# Patient Record
Sex: FEMALE | Race: WHITE | NOT HISPANIC OR LATINO | Employment: FULL TIME | URBAN - METROPOLITAN AREA
[De-identification: names, ages, dates, MRNs, and addresses within clinical notes are randomized per-mention and may not be internally consistent; named-entity substitution may affect disease eponyms.]

---

## 2023-09-05 PROBLEM — M17.11 PATELLOFEMORAL ARTHRITIS OF RIGHT KNEE: Status: ACTIVE | Noted: 2023-09-05

## 2023-09-05 PROBLEM — D18.00 ANGIOMA: Status: ACTIVE | Noted: 2022-03-24

## 2023-09-05 PROBLEM — N63.0 BREAST LUMP: Status: ACTIVE | Noted: 2023-09-05

## 2023-09-05 PROBLEM — D50.9 IRON DEFICIENCY ANEMIA: Status: ACTIVE | Noted: 2023-09-05

## 2023-09-05 PROBLEM — K21.9 GERD (GASTROESOPHAGEAL REFLUX DISEASE): Status: ACTIVE | Noted: 2023-09-05

## 2023-09-05 PROBLEM — N13.30 HYDRONEPHROSIS: Status: ACTIVE | Noted: 2023-09-05

## 2023-09-05 PROBLEM — R41.3 MEMORY DIFFICULTIES: Status: ACTIVE | Noted: 2023-09-05

## 2023-09-05 PROBLEM — M72.2 PLANTAR FASCIITIS OF RIGHT FOOT: Status: ACTIVE | Noted: 2023-09-05

## 2023-09-05 PROBLEM — H26.9 CATARACT: Status: ACTIVE | Noted: 2023-09-05

## 2023-09-05 PROBLEM — G21.9: Status: RESOLVED | Noted: 2023-09-05 | Resolved: 2023-09-05

## 2023-09-05 PROBLEM — K59.4 RECTAL SPASM: Status: ACTIVE | Noted: 2023-09-05

## 2023-09-05 PROBLEM — L81.4 LENTIGINES: Status: ACTIVE | Noted: 2022-03-24

## 2023-09-05 PROBLEM — F43.89 STRESS REACTION, CHRONIC: Status: ACTIVE | Noted: 2023-09-05

## 2023-09-05 PROBLEM — G20.C PARKINSONISM (MULTI): Status: ACTIVE | Noted: 2023-09-05

## 2023-09-05 PROBLEM — R73.03 PREDIABETES: Status: ACTIVE | Noted: 2023-09-05

## 2023-09-05 PROBLEM — E53.8 VITAMIN B12 DEFICIENCY: Status: ACTIVE | Noted: 2023-09-05

## 2023-09-05 PROBLEM — Z97.3 WEARS GLASSES: Status: ACTIVE | Noted: 2023-09-05

## 2023-09-05 PROBLEM — H52.7 REFRACTIVE ERROR: Status: ACTIVE | Noted: 2023-09-05

## 2023-09-05 PROBLEM — H25.13 NUCLEAR SCLEROSIS OF BOTH EYES: Status: ACTIVE | Noted: 2023-09-05

## 2023-09-05 PROBLEM — G21.9: Status: ACTIVE | Noted: 2023-09-05

## 2023-09-05 PROBLEM — H43.813 PVD (POSTERIOR VITREOUS DETACHMENT), BOTH EYES: Status: ACTIVE | Noted: 2023-09-05

## 2023-09-05 PROBLEM — N23 RENAL COLIC: Status: ACTIVE | Noted: 2023-09-05

## 2023-09-05 PROBLEM — R41.3 MEMORY LOSS: Status: ACTIVE | Noted: 2023-09-05

## 2023-09-05 PROBLEM — E86.0 DEHYDRATION: Status: ACTIVE | Noted: 2023-09-05

## 2023-09-05 PROBLEM — L57.0 ACTINIC KERATOSIS: Status: ACTIVE | Noted: 2022-03-24

## 2023-09-05 PROBLEM — L57.8 SUN-DAMAGED SKIN: Status: ACTIVE | Noted: 2022-03-24

## 2023-09-05 PROBLEM — M54.2 CERVICALGIA: Status: ACTIVE | Noted: 2023-09-05

## 2023-09-05 PROBLEM — R04.0 EPISTAXIS: Status: ACTIVE | Noted: 2023-09-05

## 2023-09-05 PROBLEM — E55.9 VITAMIN D DEFICIENCY: Status: ACTIVE | Noted: 2023-09-05

## 2023-09-05 PROBLEM — R55 SYNCOPE AND COLLAPSE: Status: ACTIVE | Noted: 2023-09-05

## 2023-09-05 PROBLEM — L82.1 OTHER SEBORRHEIC KERATOSIS: Status: ACTIVE | Noted: 2022-03-24

## 2023-09-05 PROBLEM — H18.452 SALZMANN'S NODULAR DEGENERATION OF CORNEA OF LEFT EYE: Status: ACTIVE | Noted: 2023-09-05

## 2023-09-05 PROBLEM — I10 ELEVATED BLOOD PRESSURE READING WITH DIAGNOSIS OF HYPERTENSION: Status: ACTIVE | Noted: 2023-09-05

## 2023-09-05 PROBLEM — M75.82 TENDINITIS OF LEFT ROTATOR CUFF: Status: ACTIVE | Noted: 2023-09-05

## 2023-09-05 PROBLEM — G25.0 ESSENTIAL TREMOR: Status: ACTIVE | Noted: 2023-09-05

## 2023-09-05 PROBLEM — S40.022A HEMATOMA OF ARM, LEFT, INITIAL ENCOUNTER: Status: ACTIVE | Noted: 2023-09-05

## 2023-09-05 PROBLEM — I10 BENIGN ESSENTIAL HTN: Status: ACTIVE | Noted: 2023-09-05

## 2023-09-05 PROBLEM — Z98.890 HISTORY OF REPAIR OF RETINAL TEAR BY LASER PHOTOCOAGULATION: Status: ACTIVE | Noted: 2023-09-05

## 2023-09-05 PROBLEM — M77.8 TENDINITIS OF LEFT SHOULDER: Status: ACTIVE | Noted: 2023-09-05

## 2023-09-05 PROBLEM — R06.09 DYSPNEA ON EFFORT: Status: ACTIVE | Noted: 2023-09-05

## 2023-09-05 PROBLEM — N39.41 URGE INCONTINENCE: Status: ACTIVE | Noted: 2023-09-05

## 2023-09-05 PROBLEM — H43.392 VITREOUS FLOATERS OF LEFT EYE: Status: ACTIVE | Noted: 2023-09-05

## 2023-09-05 PROBLEM — M26.609 TMJ (TEMPOROMANDIBULAR JOINT SYNDROME): Status: ACTIVE | Noted: 2023-09-05

## 2023-09-05 RX ORDER — PRIMIDONE 50 MG/1
3 TABLET ORAL NIGHTLY
COMMUNITY
End: 2023-10-11 | Stop reason: SDUPTHER

## 2023-09-05 RX ORDER — SODIUM, POTASSIUM,MAG SULFATES 17.5-3.13G
SOLUTION, RECONSTITUTED, ORAL ORAL
COMMUNITY
Start: 2022-01-13 | End: 2023-10-11

## 2023-09-05 RX ORDER — ACETAMINOPHEN 500 MG
1 TABLET ORAL DAILY
COMMUNITY

## 2023-09-05 RX ORDER — NIRMATRELVIR AND RITONAVIR 300-100 MG
KIT ORAL
COMMUNITY
Start: 2022-07-20 | End: 2023-10-11

## 2023-09-05 RX ORDER — MIRABEGRON 50 MG/1
1 TABLET, EXTENDED RELEASE ORAL DAILY
COMMUNITY
Start: 2022-06-02 | End: 2023-10-11

## 2023-09-05 RX ORDER — PROPRANOLOL HYDROCHLORIDE 40 MG/1
1 TABLET ORAL 2 TIMES DAILY
COMMUNITY
Start: 2021-09-28 | End: 2024-03-27 | Stop reason: SDUPTHER

## 2023-10-11 ENCOUNTER — OFFICE VISIT (OUTPATIENT)
Dept: NEUROLOGY | Facility: CLINIC | Age: 63
End: 2023-10-11
Payer: COMMERCIAL

## 2023-10-11 VITALS
WEIGHT: 138 LBS | SYSTOLIC BLOOD PRESSURE: 154 MMHG | HEART RATE: 59 BPM | RESPIRATION RATE: 16 BRPM | BODY MASS INDEX: 25.24 KG/M2 | DIASTOLIC BLOOD PRESSURE: 86 MMHG

## 2023-10-11 DIAGNOSIS — G25.0 ESSENTIAL TREMOR: Primary | ICD-10-CM

## 2023-10-11 PROCEDURE — 3077F SYST BP >= 140 MM HG: CPT | Performed by: PSYCHIATRY & NEUROLOGY

## 2023-10-11 PROCEDURE — 1036F TOBACCO NON-USER: CPT | Performed by: PSYCHIATRY & NEUROLOGY

## 2023-10-11 PROCEDURE — 99215 OFFICE O/P EST HI 40 MIN: CPT | Performed by: PSYCHIATRY & NEUROLOGY

## 2023-10-11 PROCEDURE — 3079F DIAST BP 80-89 MM HG: CPT | Performed by: PSYCHIATRY & NEUROLOGY

## 2023-10-11 RX ORDER — CLOTRIMAZOLE AND BETAMETHASONE DIPROPIONATE 10; .64 MG/G; MG/G
CREAM TOPICAL
COMMUNITY
Start: 2023-06-22

## 2023-10-11 RX ORDER — PRIMIDONE 50 MG/1
150 TABLET ORAL NIGHTLY
Qty: 270 TABLET | Refills: 2 | Status: SHIPPED | OUTPATIENT
Start: 2023-10-11 | End: 2024-04-16 | Stop reason: SDUPTHER

## 2023-10-11 ASSESSMENT — UNIFIED PARKINSONS DISEASE RATING SCALE (UPDRS)
SPEECH: 0
CONSTANCY_TREMOR_ATREST: 0
RIGIDITY_LLE: 0
PRONATION_SUPINATION_LEFT: 0
KINETIC_TREMOR_RIGHTHAND: 2
FREEZING_GAIT: 0
POSTURAL_TREMOR_LEFTHAND: 1
FINGER_TAPPING_LEFT: 1
PRONATION_SUPINATION_RIGHT: 0
RIGIDITY_RLE: 0
TOETAPPING_RIGHT: 0
LEVODOPA: NO
AMPLITUDE_LLE: 0
RIGIDITY_LUE: 1
AMPLITUDE_LUE: 0
AMPLITUDE_RUE: 0
AMPLITUDE_LIP_JAW: 0
LEG_AGILITY_LEFT: 0
POSTURAL_STABILITY: 0
PARKINSONS_MEDS: NO
RIGIDITY_NECK: 0
FACIAL_EXPRESSION: 0
TOETAPPING_LEFT: 0
SPONTANEITY_OF_MOVEMENT: 0
POSTURAL_TREMOR_RIGHTHAND: 1
CHAIR_RISING_SCALE: 0
FINGER_TAPPING_RIGHT: 1
HANDMOVEMENTS_RIGHT: 0
POSTURE: 0
GAIT: 0
TOTAL_SCORE: 9
KINETIC_TREMOR_LEFTHAND: 1
RIGIDITY_RUE: 1
AMPLITUDE_RLE: 0
LEG_AGILITY_RIGHT: 0

## 2023-10-11 ASSESSMENT — PATIENT HEALTH QUESTIONNAIRE - PHQ9
2. FEELING DOWN, DEPRESSED OR HOPELESS: NOT AT ALL
1. LITTLE INTEREST OR PLEASURE IN DOING THINGS: NOT AT ALL
SUM OF ALL RESPONSES TO PHQ9 QUESTIONS 1 AND 2: 0

## 2023-10-11 ASSESSMENT — ENCOUNTER SYMPTOMS
DEPRESSION: 0
OCCASIONAL FEELINGS OF UNSTEADINESS: 0
LOSS OF SENSATION IN FEET: 0

## 2023-10-11 NOTE — PATIENT INSTRUCTIONS
"It was a pleasure seeing you today for essential tremor.     Please make a follow up appointment at the  or by calling the office in 6 months.     For any urgent issues or questions call [phone], option for doctor's , during our office hours Monday-Friday 8am-4:30pm, and leave a voicemail with your concern.  My office will try to reach back you as soon as possible within 24 (business) hours.  If you have an emergency please call 911 or visit a local urgent care or nearest emergency room.      Please understand that [employer] is a useful communication tool for simple \"normal\" results or a refill request but I would not recommend using this tool for emergent or urgent issues.  I am happy to ask my staff to arrange a follow up visit or a virtual visit sooner than requested with myself or Jordan (Nurse Practitioner), if appropriate for your care.    "

## 2023-10-11 NOTE — PROGRESS NOTES
Subjective     Kavita Jung is a right handed  63 y.o. year old female who presents with tremor.  Visit type: follow up visit     HPI    Patient Health Questionnaire-2 Score: 0         Ms. Jung is a 63 y.o.  RH woman with ET. Tremor is a little better.  STM is unchanged from 2019 when she had no neurodegenerative pattern on neuropsych with only mild deficits.    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    Objective   Neurological Exam    Vitals:    10/11/23 1155 10/11/23 1156   BP: 149/75 154/86   BP Location: Right arm Right arm   Patient Position: Sitting Standing   BP Cuff Size: Adult Adult   Pulse: 58 59   Resp: 16    Weight: 62.6 kg (138 lb)          Well-appearing woman in NAD  AAOx3  bl kinetic tremor LH>RH  Spirals are c/w sinusoidal tremor    Physical Exam    MDS UPDRS 1st Score: Motor Examination  Is the patient on medication for treating the symptoms of Parkinson's Disease?: No  Is the patient on Levodopa?: No  Speech: 0  Facial Expression: 0  Rigidty Neck: 0  Rigidty RUE: 1  Rigidity - LUE: 1  Rigidity RLE: 0  Rigidity LLE: 0  Finger Tapping Right Hand: 1  Finger Tapping Left Hand: 1  Hand Movements- Right Hand: 0  Hand Movements- Left Hand: 0  Pronatiaon-Supination Movments - Right Hand: 0  Pronatiaon-Supination Movments Left Hand: 0  Toe Tapping Right Foot: 0  Toe Tapping - Left Foot: 0  Leg Agility - Right Le  Leg Agility - Left leg:  0  Arising from Chair: 0  Gait: 0  Freezing of Gait: 0  Postural Stability: 0  Posture: 0  Global Spontanteity of Movment ( Body Bradykinesia): 0  Postural Tremor - Right Hand: 1  Postural Tremor - Left hand: 1  Kinetic Tremor - Right hand: 2  Kinetic Tremor - Left hand: 1  Rest Tremor Amplitude - RUE: 0  Rest Tremor Amplitude - LUE: 0  Rest Tremor Amplitude - RLE: 0  Rest Tremor Amplitude - LLE: 0  Rest Tremor Amplitude - Lip/Jaw: 0  Constancy of Rest Tremor: 0  MDS UPDRS Total Score: 9                                 Assessment/Plan   Ms. Jung is a 61 YO RH woman with ET and minimal parkinsonism that is questionable and may not be a significant finding. In the future if higher doses of primidone can be tried again.  Otherwise she is running out of medical options for her tremor.  Follow up in 6 months.

## 2024-01-23 ENCOUNTER — HOSPITAL ENCOUNTER (EMERGENCY)
Facility: HOSPITAL | Age: 64
Discharge: HOME | End: 2024-01-23
Attending: STUDENT IN AN ORGANIZED HEALTH CARE EDUCATION/TRAINING PROGRAM
Payer: COMMERCIAL

## 2024-01-23 ENCOUNTER — APPOINTMENT (OUTPATIENT)
Dept: RADIOLOGY | Facility: HOSPITAL | Age: 64
End: 2024-01-23
Payer: COMMERCIAL

## 2024-01-23 VITALS
WEIGHT: 136 LBS | RESPIRATION RATE: 18 BRPM | BODY MASS INDEX: 25.03 KG/M2 | SYSTOLIC BLOOD PRESSURE: 165 MMHG | HEART RATE: 80 BPM | OXYGEN SATURATION: 97 % | TEMPERATURE: 98.1 F | HEIGHT: 62 IN | DIASTOLIC BLOOD PRESSURE: 92 MMHG

## 2024-01-23 DIAGNOSIS — R10.9 ABDOMINAL PAIN, UNSPECIFIED ABDOMINAL LOCATION: ICD-10-CM

## 2024-01-23 DIAGNOSIS — I15.9 SECONDARY HYPERTENSION: ICD-10-CM

## 2024-01-23 DIAGNOSIS — K76.0 HEPATIC STEATOSIS: ICD-10-CM

## 2024-01-23 DIAGNOSIS — R10.9 FLANK PAIN: Primary | ICD-10-CM

## 2024-01-23 LAB
ALBUMIN SERPL BCP-MCNC: 4.2 G/DL (ref 3.4–5)
ALP SERPL-CCNC: 91 U/L (ref 33–136)
ALT SERPL W P-5'-P-CCNC: 20 U/L (ref 7–45)
ANION GAP SERPL CALC-SCNC: 12 MMOL/L (ref 10–20)
APPEARANCE UR: CLEAR
AST SERPL W P-5'-P-CCNC: 19 U/L (ref 9–39)
BASOPHILS # BLD AUTO: 0.04 X10*3/UL (ref 0–0.1)
BASOPHILS NFR BLD AUTO: 0.5 %
BILIRUB SERPL-MCNC: 0.3 MG/DL (ref 0–1.2)
BILIRUB UR STRIP.AUTO-MCNC: NEGATIVE MG/DL
BUN SERPL-MCNC: 8 MG/DL (ref 6–23)
CALCIUM SERPL-MCNC: 9.3 MG/DL (ref 8.6–10.3)
CHLORIDE SERPL-SCNC: 100 MMOL/L (ref 98–107)
CO2 SERPL-SCNC: 30 MMOL/L (ref 21–32)
COLOR UR: COLORLESS
CREAT SERPL-MCNC: 0.69 MG/DL (ref 0.5–1.05)
EGFRCR SERPLBLD CKD-EPI 2021: >90 ML/MIN/1.73M*2
EOSINOPHIL # BLD AUTO: 0.16 X10*3/UL (ref 0–0.7)
EOSINOPHIL NFR BLD AUTO: 2.2 %
ERYTHROCYTE [DISTWIDTH] IN BLOOD BY AUTOMATED COUNT: 12.7 % (ref 11.5–14.5)
GLUCOSE SERPL-MCNC: 143 MG/DL (ref 74–99)
GLUCOSE UR STRIP.AUTO-MCNC: NEGATIVE MG/DL
HCT VFR BLD AUTO: 44.2 % (ref 36–46)
HGB BLD-MCNC: 14.5 G/DL (ref 12–16)
IMM GRANULOCYTES # BLD AUTO: 0.03 X10*3/UL (ref 0–0.7)
IMM GRANULOCYTES NFR BLD AUTO: 0.4 % (ref 0–0.9)
KETONES UR STRIP.AUTO-MCNC: NEGATIVE MG/DL
LEUKOCYTE ESTERASE UR QL STRIP.AUTO: NEGATIVE
LIPASE SERPL-CCNC: 44 U/L (ref 9–82)
LYMPHOCYTES # BLD AUTO: 2.19 X10*3/UL (ref 1.2–4.8)
LYMPHOCYTES NFR BLD AUTO: 29.6 %
MCH RBC QN AUTO: 30.9 PG (ref 26–34)
MCHC RBC AUTO-ENTMCNC: 32.8 G/DL (ref 32–36)
MCV RBC AUTO: 94 FL (ref 80–100)
MONOCYTES # BLD AUTO: 0.67 X10*3/UL (ref 0.1–1)
MONOCYTES NFR BLD AUTO: 9.1 %
NEUTROPHILS # BLD AUTO: 4.31 X10*3/UL (ref 1.2–7.7)
NEUTROPHILS NFR BLD AUTO: 58.2 %
NITRITE UR QL STRIP.AUTO: NEGATIVE
NRBC BLD-RTO: 0 /100 WBCS (ref 0–0)
PH UR STRIP.AUTO: 7 [PH]
PLATELET # BLD AUTO: 238 X10*3/UL (ref 150–450)
POTASSIUM SERPL-SCNC: 4.5 MMOL/L (ref 3.5–5.3)
PROT SERPL-MCNC: 7.6 G/DL (ref 6.4–8.2)
PROT UR STRIP.AUTO-MCNC: NEGATIVE MG/DL
RBC # BLD AUTO: 4.69 X10*6/UL (ref 4–5.2)
RBC # UR STRIP.AUTO: NEGATIVE /UL
SODIUM SERPL-SCNC: 137 MMOL/L (ref 136–145)
SP GR UR STRIP.AUTO: 1
UROBILINOGEN UR STRIP.AUTO-MCNC: <2 MG/DL
WBC # BLD AUTO: 7.4 X10*3/UL (ref 4.4–11.3)

## 2024-01-23 PROCEDURE — 83690 ASSAY OF LIPASE: CPT | Performed by: STUDENT IN AN ORGANIZED HEALTH CARE EDUCATION/TRAINING PROGRAM

## 2024-01-23 PROCEDURE — 74176 CT ABD & PELVIS W/O CONTRAST: CPT

## 2024-01-23 PROCEDURE — 74176 CT ABD & PELVIS W/O CONTRAST: CPT | Performed by: RADIOLOGY

## 2024-01-23 PROCEDURE — 99284 EMERGENCY DEPT VISIT MOD MDM: CPT | Mod: 25

## 2024-01-23 PROCEDURE — 80053 COMPREHEN METABOLIC PANEL: CPT | Performed by: STUDENT IN AN ORGANIZED HEALTH CARE EDUCATION/TRAINING PROGRAM

## 2024-01-23 PROCEDURE — 99284 EMERGENCY DEPT VISIT MOD MDM: CPT | Performed by: STUDENT IN AN ORGANIZED HEALTH CARE EDUCATION/TRAINING PROGRAM

## 2024-01-23 PROCEDURE — 36415 COLL VENOUS BLD VENIPUNCTURE: CPT | Performed by: EMERGENCY MEDICINE

## 2024-01-23 PROCEDURE — 85025 COMPLETE CBC W/AUTO DIFF WBC: CPT | Performed by: EMERGENCY MEDICINE

## 2024-01-23 PROCEDURE — 81003 URINALYSIS AUTO W/O SCOPE: CPT | Performed by: STUDENT IN AN ORGANIZED HEALTH CARE EDUCATION/TRAINING PROGRAM

## 2024-01-23 PROCEDURE — 85025 COMPLETE CBC W/AUTO DIFF WBC: CPT | Performed by: STUDENT IN AN ORGANIZED HEALTH CARE EDUCATION/TRAINING PROGRAM

## 2024-01-23 PROCEDURE — 84075 ASSAY ALKALINE PHOSPHATASE: CPT | Performed by: EMERGENCY MEDICINE

## 2024-01-23 PROCEDURE — 81003 URINALYSIS AUTO W/O SCOPE: CPT | Performed by: EMERGENCY MEDICINE

## 2024-01-23 ASSESSMENT — COLUMBIA-SUICIDE SEVERITY RATING SCALE - C-SSRS
2. HAVE YOU ACTUALLY HAD ANY THOUGHTS OF KILLING YOURSELF?: NO
1. IN THE PAST MONTH, HAVE YOU WISHED YOU WERE DEAD OR WISHED YOU COULD GO TO SLEEP AND NOT WAKE UP?: NO
6. HAVE YOU EVER DONE ANYTHING, STARTED TO DO ANYTHING, OR PREPARED TO DO ANYTHING TO END YOUR LIFE?: NO

## 2024-01-23 ASSESSMENT — PAIN SCALES - GENERAL: PAINLEVEL_OUTOF10: 8

## 2024-01-23 NOTE — ED TRIAGE NOTES
The patient was seen and examined in triage.    History of Present Illness: The patient is a 63-year-old female history of hypertension recurrent nephrolithiasis tremors presenting to the emergency department for urinary frequency as well as flank pain radiating to the left groin region.  Patient reports pain to be worse with urination.  No other history reported at this time    Brief Physical Exam:   Exam is limited by the patient sitting in a chair in triage.   Heart: Regular rate and rhythm.   Lungs: Clear to auscultation bilaterally.   Abdomen: Soft, nondistended, normoactive bowel sounds, nontender.  No CVA tenderness    Plan: Appropriate labs and diagnostic imaging were ordered.      For the remainder of the patient's workup and ED course, please refer to the main ED provider note. We discussed need for diagnostic testing including laboratory studies and imaging.  We also discussed that patient may be asked to wait in the waiting room while these tests are pending.  They understand that if they choose to leave without having the testing completed or resulted that we cannot rule out acute life threatening illnesses and the risks involved could lead to worsening condition, permanent disability or even death.     Disclaimer: This note was dictated by speech recognition. Minor errors in transcription may be present. Please call if questions.      resilient/elastic

## 2024-01-24 NOTE — DISCHARGE INSTRUCTIONS
You are found to have elevated blood pressure here in the emergency department I recommend that you follow-up with your primary care doctor soon as possible to have your blood pressure reevaluated and see if you require either a change in medication or be started on medication.    You have been evaluated in the Emergency Department for abdominal pain. Many cases of abdominal pain are not life threatening and can be treated at home; however, there are some serious causes of abdominal pain that require further evaluation. Not all causes of abdominal pain are detected on early imaging or labs. Avoid eating fatty food or any general foods that cause the abdominal pain. Please seek medical attention if you have any sudden worsening in your abdominal pain or develop any fevers, difficulty urinating or having a bowel movement, repeated episodes of vomiting, have significant diarrhea, or have any blood or dark/tarry stools. You should return to the hospital if you experience return of persistent nausea and vomiting that does not resolve and does not allow you to tolerate any food or fluids, persistent fevers for greater than 2-3 more days, increasing abdominal pain that persists despite medications, persistent diarrhea, dizziness, syncope (fainting), or for any other concerns.  If you were prescribed any medicine for home, please take as prescribed by your health-care provider. If you were given any follow-up appointments or numbers to call, please do so as instructed.    You are found to have fatty infiltration of your liver.  Please follow-up with your primary care doctor to have that reevaluated.

## 2024-01-24 NOTE — ED PROVIDER NOTES
HPI   Chief Complaint   Patient presents with   • Flank Pain     Pt presents to ED for L sided flank pain x1 week with hx of kidney stones. Pt reports urinary frequency and pressure. Denies CP/SOB, abd pain.        63-year-old female past medical history of hypertension and previous history of nephrolithiasis and tremors presented to the emergency department for urinary frequency and flank pain that was radiating to the groin.  Upon examination patient reports no fevers chills chest pain shortness of breath.  She denies any dysuria hematuria or any other muscle aches and pains at this time.  There is no other exacerbating or alleviating symptoms except for urination.                            No data recorded                Patient History   Past Medical History:   Diagnosis Date   • Acute vaginitis 10/20/2022    Vaginitis   • Calculus of kidney 10/17/2017    Kidney stone on left side   • Horseshoe tear of retina without detachment, right eye     Retinal tear of right eye   • Other abnormalities of breathing     Difficulty breathing   • Other conditions influencing health status     Gestational Diabetes Mellitus   • Other conditions influencing health status     Colonoscopy (Fiberoptic)   • Other conditions influencing health status     Mammogram   • Other conditions influencing health status     Reported Pap Smear   • Other conditions influencing health status 01/04/2017    History of cough   • Other vitreous opacities, right eye 10/17/2017    Vitreous floaters of right eye   • Personal history of other diseases of the digestive system     History of esophageal reflux   • Personal history of other diseases of the musculoskeletal system and connective tissue 07/22/2016    History of temporomandibular joint disorder   • Personal history of other specified conditions 02/11/2014    History of diarrhea   • Personal history of urinary calculi 12/12/2018    History of renal calculi     Past Surgical History:   Procedure  Laterality Date   •  SECTION, CLASSIC  2014     Section   • CHOLECYSTECTOMY  2014    Cholecystectomy Laparoscopic   • CT ABDOMEN PELVIS ANGIOGRAM W AND/OR WO IV CONTRAST  2017    CT ABDOMEN PELVIS ANGIOGRAM W AND/OR WO IV CONTRAST 2017 [facility]   • DILATION AND CURETTAGE OF UTERUS  2014    Dilation And Curettage   • OTHER SURGICAL HISTORY  10/11/2019    Retinal laser photocoagulation   • TONSILLECTOMY  2014    Tonsillectomy   • TUBAL LIGATION  2014    Tubal Ligation     Family History   Problem Relation Name Age of Onset   • Tremor Mother          benign essential   • Dementia Mother     • Alzheimer's disease Mother          late onset, without behavioral disturbance   • Colonic polyp Father     • Diabetes Father     • Heart disease Father     • Hypertension Father     • Heart attack Brother     • Parkinsonism Brother          asymptomatic     Social History     Tobacco Use   • Smoking status: Never   • Smokeless tobacco: Never   Substance Use Topics   • Alcohol use: Never   • Drug use: Never       Physical Exam   ED Triage Vitals [24 1516]   Temperature Heart Rate Respirations BP   36.7 °C (98.1 °F) 64 16 175/82      Pulse Ox Temp Source Heart Rate Source Patient Position   98 % Oral Monitor --      BP Location FiO2 (%)     -- --       Physical Exam  Vitals reviewed.   Constitutional:       Appearance: Normal appearance.   HENT:      Head: Normocephalic and atraumatic.      Nose: Nose normal.      Mouth/Throat:      Mouth: Mucous membranes are moist.   Eyes:      Extraocular Movements: Extraocular movements intact.      Conjunctiva/sclera: Conjunctivae normal.   Cardiovascular:      Rate and Rhythm: Normal rate and regular rhythm.      Pulses: Normal pulses.      Heart sounds: Normal heart sounds.   Pulmonary:      Effort: Pulmonary effort is normal.      Breath sounds: Normal breath sounds.   Abdominal:      General: Abdomen is flat. Bowel  sounds are normal.      Palpations: Abdomen is soft.      Tenderness: There is no abdominal tenderness. There is no right CVA tenderness or left CVA tenderness.   Musculoskeletal:         General: Normal range of motion.   Skin:     General: Skin is warm and dry.      Capillary Refill: Capillary refill takes less than 2 seconds.   Neurological:      Mental Status: She is alert and oriented to person, place, and time. Mental status is at baseline.      Cranial Nerves: Cranial nerves 2-12 are intact. No cranial nerve deficit.      Sensory: Sensation is intact. No sensory deficit.      Motor: Motor function is intact. No weakness.   Psychiatric:         Mood and Affect: Mood normal.         ED Course & MDM   ED Course as of 01/23/24 2153 Tue Jan 23, 2024 1816 63-year-old female history of hypertension previous history of nephrolithiasis and tremors presented to the emergency department for urinary frequency and flank pain.  On examination vital signs are stable.  Abdomen nontender no flank tenderness equal breath sounds bilaterally 2+ peripheral pulses no other aberrations appreciated examination.  Differential diagnosis included urinary tract infection nephrolithiasis.  Other differentials include pancreatitis.  CT CBC CMP lipase urinalysis and CT imaging without contrast was ordered.   [ZS]   2149 Vital signs are stable patient continues to be well-appearing in no acute distress she has no active pain or discomfort.  Urinalysis showed no signs of infection CBC unremarkable CMP shows no acute pathology glucose was 143 that was communicated to patient.  Lipase 44.  No acute findings on CT imaging on my interpretation.  Official read did reveal that patient does have mild hepatic steatosis which she was made aware of.  Return precautions were discussed she will follow-up with primary care physician. [ZS]      ED Course User Index  [ZS] Angelia Levine MD         Diagnoses as of 01/23/24 2153   Flank pain   Abdominal  pain, unspecified abdominal location   Hepatic steatosis   Secondary hypertension       Medical Decision Making      Procedure  Procedures     Angelia Levine MD  01/23/24 2002

## 2024-01-30 ENCOUNTER — OFFICE VISIT (OUTPATIENT)
Dept: OPHTHALMOLOGY | Facility: CLINIC | Age: 64
End: 2024-01-30
Payer: COMMERCIAL

## 2024-01-30 DIAGNOSIS — Z98.890 HISTORY OF REPAIR OF RETINAL TEAR BY LASER PHOTOCOAGULATION: ICD-10-CM

## 2024-01-30 DIAGNOSIS — R73.03 PREDIABETES: ICD-10-CM

## 2024-01-30 DIAGNOSIS — H43.813 PVD (POSTERIOR VITREOUS DETACHMENT), BOTH EYES: ICD-10-CM

## 2024-01-30 DIAGNOSIS — H52.4 MYOPIA WITH PRESBYOPIA OF BOTH EYES: Primary | ICD-10-CM

## 2024-01-30 DIAGNOSIS — H25.13 NUCLEAR SCLEROSIS OF BOTH EYES: ICD-10-CM

## 2024-01-30 DIAGNOSIS — H52.13 MYOPIA WITH PRESBYOPIA OF BOTH EYES: Primary | ICD-10-CM

## 2024-01-30 PROBLEM — R25.1 TREMORS OF NERVOUS SYSTEM: Status: ACTIVE | Noted: 2024-01-30

## 2024-01-30 PROCEDURE — 92015 DETERMINE REFRACTIVE STATE: CPT | Performed by: STUDENT IN AN ORGANIZED HEALTH CARE EDUCATION/TRAINING PROGRAM

## 2024-01-30 PROCEDURE — 92014 COMPRE OPH EXAM EST PT 1/>: CPT | Performed by: STUDENT IN AN ORGANIZED HEALTH CARE EDUCATION/TRAINING PROGRAM

## 2024-01-30 RX ORDER — CYCLOBENZAPRINE HCL 5 MG
TABLET ORAL
COMMUNITY
Start: 2021-03-06

## 2024-01-30 RX ORDER — BENZONATATE 100 MG/1
CAPSULE ORAL
COMMUNITY
Start: 2019-12-12

## 2024-01-30 RX ORDER — METHYLPREDNISOLONE 4 MG/1
TABLET ORAL
COMMUNITY
End: 2024-04-16 | Stop reason: WASHOUT

## 2024-01-30 RX ORDER — VIBEGRON 75 MG/1
TABLET, FILM COATED ORAL
COMMUNITY
Start: 2021-08-18

## 2024-01-30 RX ORDER — DIPHENOXYLATE HYDROCHLORIDE AND ATROPINE SULFATE 2.5; .025 MG/1; MG/1
TABLET ORAL
COMMUNITY
Start: 2020-10-16

## 2024-01-30 RX ORDER — DICYCLOMINE HYDROCHLORIDE 10 MG/1
CAPSULE ORAL
COMMUNITY

## 2024-01-30 RX ORDER — AZITHROMYCIN 250 MG/1
TABLET, FILM COATED ORAL
COMMUNITY
Start: 2022-11-01

## 2024-01-30 RX ORDER — MONTELUKAST SODIUM 10 MG/1
TABLET ORAL
COMMUNITY
Start: 2022-11-09

## 2024-01-30 RX ORDER — NIRMATRELVIR AND RITONAVIR 300-100 MG
KIT ORAL
COMMUNITY
Start: 2022-07-20

## 2024-01-30 RX ORDER — AMOXICILLIN 500 MG/1
500 TABLET, FILM COATED ORAL 3 TIMES DAILY
COMMUNITY
Start: 2023-12-22

## 2024-01-30 RX ORDER — AMOXICILLIN AND CLAVULANATE POTASSIUM 875; 125 MG/1; MG/1
TABLET, FILM COATED ORAL
COMMUNITY
Start: 2023-12-11

## 2024-01-30 RX ORDER — SODIUM, POTASSIUM,MAG SULFATES 17.5-3.13G
SOLUTION, RECONSTITUTED, ORAL ORAL
COMMUNITY
Start: 2022-01-13

## 2024-01-30 RX ORDER — FLUTICASONE PROPIONATE 50 MCG
SPRAY, SUSPENSION (ML) NASAL EVERY 12 HOURS
COMMUNITY
Start: 2019-11-21

## 2024-01-30 RX ORDER — ERGOCALCIFEROL 1.25 MG/1
CAPSULE ORAL
COMMUNITY
Start: 2020-08-27

## 2024-01-30 RX ORDER — DICLOFENAC SODIUM 10 MG/G
2 GEL TOPICAL
COMMUNITY
Start: 2019-07-19

## 2024-01-30 RX ORDER — HYDROCODONE BITARTRATE AND ACETAMINOPHEN 5; 325 MG/1; MG/1
1 TABLET ORAL EVERY 4 HOURS PRN
COMMUNITY
Start: 2023-12-22

## 2024-01-30 RX ORDER — GABAPENTIN 100 MG/1
CAPSULE ORAL
COMMUNITY
Start: 2023-01-31 | End: 2024-04-16 | Stop reason: WASHOUT

## 2024-01-30 ASSESSMENT — ENCOUNTER SYMPTOMS
MUSCULOSKELETAL NEGATIVE: 0
CONSTITUTIONAL NEGATIVE: 0
EYES NEGATIVE: 0
ALLERGIC/IMMUNOLOGIC NEGATIVE: 0
GASTROINTESTINAL NEGATIVE: 0
HEMATOLOGIC/LYMPHATIC NEGATIVE: 0
RESPIRATORY NEGATIVE: 0
NEUROLOGICAL NEGATIVE: 0
CARDIOVASCULAR NEGATIVE: 0
ENDOCRINE NEGATIVE: 0
PSYCHIATRIC NEGATIVE: 0

## 2024-01-30 ASSESSMENT — REFRACTION_MANIFEST
OD_SPHERE: -7.50
OS_SPHERE: -8.00
OS_CYLINDER: +1.00
OS_AXIS: 010
OD_CYLINDER: SPHERE
OS_ADD: +2.50
OD_ADD: +2.50

## 2024-01-30 ASSESSMENT — REFRACTION_WEARINGRX
OS_AXIS: 012
OS_CYLINDER: +1.00
OD_CYLINDER: SPHERE
OS_ADD: +2.50
OD_SPHERE: -7.75
OD_ADD: +2.50
OS_SPHERE: -7.75

## 2024-01-30 ASSESSMENT — CONF VISUAL FIELD
OD_SUPERIOR_TEMPORAL_RESTRICTION: 0
OD_NORMAL: 1
OS_INFERIOR_NASAL_RESTRICTION: 0
OD_INFERIOR_TEMPORAL_RESTRICTION: 0
OD_INFERIOR_NASAL_RESTRICTION: 0
OS_SUPERIOR_TEMPORAL_RESTRICTION: 0
OS_SUPERIOR_NASAL_RESTRICTION: 0
OS_INFERIOR_TEMPORAL_RESTRICTION: 0
OD_SUPERIOR_NASAL_RESTRICTION: 0
OS_NORMAL: 1
METHOD: COUNTING FINGERS

## 2024-01-30 ASSESSMENT — VISUAL ACUITY
OD_CC: 20/30
METHOD: SNELLEN - LINEAR
OS_CC: 20/40

## 2024-01-30 ASSESSMENT — CUP TO DISC RATIO
OS_RATIO: .3
OD_RATIO: .3

## 2024-01-30 ASSESSMENT — SLIT LAMP EXAM - LIDS
COMMENTS: GOOD POSITION
COMMENTS: GOOD POSITION

## 2024-01-30 ASSESSMENT — EXTERNAL EXAM - RIGHT EYE: OD_EXAM: NORMAL

## 2024-01-30 ASSESSMENT — EXTERNAL EXAM - LEFT EYE: OS_EXAM: NORMAL

## 2024-01-30 ASSESSMENT — TONOMETRY
OD_IOP_MMHG: 15
IOP_METHOD: GOLDMANN APPLANATION
OS_IOP_MMHG: 14

## 2024-01-30 NOTE — PROGRESS NOTES
Assessment/Plan   Diagnoses and all orders for this visit:  Myopia with presbyopia of both eyes  -New spec rx released today per patient request. Ocular health wnl for age OU. Monitor 1 year or sooner prn. Refraction billed today.  -Mild changes to MRX; printed out a computer lens only option  Nuclear sclerosis of both eyes  -mild non visually significant. Pt ed. Monitor  History of repair of retinal tear by laser photocoagulation  PVD (posterior vitreous detachment), both eyes  -stable retinal exam  -Discussed signs and symtpoms of retinal hole, tear, detachment. Patient educated that retinal detachment can lead to permanent vision loss. Patient consents to return if they notice new floaters, flashes, curtain or veil covering vision.  Prediabetes  -no retinopathy observed on exam today od/os, pt ed to continue good BGlc, blood pressure and lipid control, rtc with any changes in vision, otherwise monitor 1 year    RTC 1 year for annual with DFE and MRX

## 2024-02-14 ENCOUNTER — OFFICE VISIT (OUTPATIENT)
Dept: PRIMARY CARE | Facility: CLINIC | Age: 64
End: 2024-02-14
Payer: COMMERCIAL

## 2024-02-14 VITALS — BODY MASS INDEX: 26.34 KG/M2 | WEIGHT: 144 LBS | SYSTOLIC BLOOD PRESSURE: 127 MMHG | DIASTOLIC BLOOD PRESSURE: 78 MMHG

## 2024-02-14 DIAGNOSIS — R53.83 OTHER FATIGUE: ICD-10-CM

## 2024-02-14 DIAGNOSIS — K76.0 FATTY LIVER: ICD-10-CM

## 2024-02-14 DIAGNOSIS — R73.02 GLUCOSE INTOLERANCE (IMPAIRED GLUCOSE TOLERANCE): ICD-10-CM

## 2024-02-14 DIAGNOSIS — I10 ELEVATED BLOOD PRESSURE READING WITH DIAGNOSIS OF HYPERTENSION: ICD-10-CM

## 2024-02-14 DIAGNOSIS — M77.8 THUMB TENDONITIS: Primary | ICD-10-CM

## 2024-02-14 PROCEDURE — 3074F SYST BP LT 130 MM HG: CPT | Performed by: INTERNAL MEDICINE

## 2024-02-14 PROCEDURE — 99214 OFFICE O/P EST MOD 30 MIN: CPT | Performed by: INTERNAL MEDICINE

## 2024-02-14 PROCEDURE — 3078F DIAST BP <80 MM HG: CPT | Performed by: INTERNAL MEDICINE

## 2024-02-14 PROCEDURE — 1036F TOBACCO NON-USER: CPT | Performed by: INTERNAL MEDICINE

## 2024-02-14 NOTE — PROGRESS NOTES
Reviewed with patient the ultrasound abdomen  Subjective   Patient ID: Kavita Jung is a 63 y.o. female who presents for No chief complaint on file..    HPI follow-up visit status post emergency room had CT scan for lower back pain and/or abdominal pain rule out lithiasis she is already status postcholecystectomy did not see a kidney stone did have some fatty liver her liver enzymes had been normal in the past blood pressure was elevated blood sugar was slightly elevated some of these issues have been discussed previously she has a high carbohydrate diet and has not exercise much    Review of Systems    Objective   There were no vitals taken for this visit.    Physical Exam  Vital signs noted alert and oriented x 3 NCAT no JVD or bruit chest clear to auscultation CV regular rate and rhythm S1-S2 abdomen soft nontender normal active bowel sounds LS spine no point tenderness extremities no clubbing cyanosis or edema normal distal [id number] musculoskeletal left thumb appears normal nonlimited range of motion just soreness for the thumb she could not change her computer      Assessment/Plan impression left thumb tendinitis fatigue elevated blood pressure glucose intolerance fatty liver  Plan working but may wear a Spika splint at night discussed with watching diet for carbohydrates and saturated fats begin to exercise more to alleviate fatigue and help with the liver and metabolism she has been urinating at night more frequently than she does during the day without drinking any more exercise may help the bladder pelvic muscles watch salt in diet good water consumption recheck glycohemoglobin with weight and blood pressure in 3 months Endor sooner as needed  tt40 cc21       We reviewed the CT scan to see if the ovaries on the pelvic portion of the examination were even mentioned (not well seen)

## 2024-02-29 ENCOUNTER — APPOINTMENT (OUTPATIENT)
Dept: OPHTHALMOLOGY | Facility: CLINIC | Age: 64
End: 2024-02-29
Payer: COMMERCIAL

## 2024-03-27 DIAGNOSIS — I10 BENIGN ESSENTIAL HTN: ICD-10-CM

## 2024-03-27 DIAGNOSIS — G25.0 ESSENTIAL TREMOR: Primary | ICD-10-CM

## 2024-03-27 RX ORDER — PROPRANOLOL HYDROCHLORIDE 40 MG/1
40 TABLET ORAL 2 TIMES DAILY
Qty: 180 TABLET | Refills: 2 | Status: SHIPPED | OUTPATIENT
Start: 2024-03-27

## 2024-04-16 ENCOUNTER — OFFICE VISIT (OUTPATIENT)
Dept: NEUROLOGY | Facility: CLINIC | Age: 64
End: 2024-04-16
Payer: COMMERCIAL

## 2024-04-16 VITALS
DIASTOLIC BLOOD PRESSURE: 83 MMHG | RESPIRATION RATE: 16 BRPM | BODY MASS INDEX: 25.79 KG/M2 | WEIGHT: 141 LBS | HEART RATE: 67 BPM | SYSTOLIC BLOOD PRESSURE: 135 MMHG

## 2024-04-16 DIAGNOSIS — G25.0 ESSENTIAL TREMOR: Primary | ICD-10-CM

## 2024-04-16 DIAGNOSIS — G25.0 ESSENTIAL TREMOR: ICD-10-CM

## 2024-04-16 DIAGNOSIS — R41.3 MEMORY LOSS: ICD-10-CM

## 2024-04-16 PROCEDURE — 3075F SYST BP GE 130 - 139MM HG: CPT | Performed by: PSYCHIATRY & NEUROLOGY

## 2024-04-16 PROCEDURE — 99214 OFFICE O/P EST MOD 30 MIN: CPT | Performed by: PSYCHIATRY & NEUROLOGY

## 2024-04-16 PROCEDURE — 1036F TOBACCO NON-USER: CPT | Performed by: PSYCHIATRY & NEUROLOGY

## 2024-04-16 PROCEDURE — 3079F DIAST BP 80-89 MM HG: CPT | Performed by: PSYCHIATRY & NEUROLOGY

## 2024-04-16 RX ORDER — PRIMIDONE 50 MG/1
150 TABLET ORAL NIGHTLY
Qty: 270 TABLET | Refills: 2 | Status: SHIPPED | OUTPATIENT
Start: 2024-04-16

## 2024-04-16 ASSESSMENT — UNIFIED PARKINSONS DISEASE RATING SCALE (UPDRS)
LEG_AGILITY_LEFT: 0
AMPLITUDE_LIP_JAW: 0
RIGIDITY_LLE: 0
RIGIDITY_RUE: 0
PRONATION_SUPINATION_RIGHT: 0
RIGIDITY_NECK: 0
PRONATION_SUPINATION_LEFT: 0
FREEZING_GAIT: 0
PARKINSONS_MEDS: NO
FINGER_TAPPING_LEFT: 1
LEVODOPA: NO
TOETAPPING_RIGHT: 0
SPEECH: 0
AMPLITUDE_RLE: 0
CONSTANCY_TREMOR_ATREST: 0
FINGER_TAPPING_RIGHT: 1
FACIAL_EXPRESSION: 0
AMPLITUDE_RUE: 0
CHAIR_RISING_SCALE: 0
DYSKINESIAS_PRESENT: NO
AMPLITUDE_LLE: 0
RIGIDITY_LUE: 0
HANDMOVEMENTS_RIGHT: 0
SPONTANEITY_OF_MOVEMENT: 0
KINETIC_TREMOR_LEFTHAND: 1
AMPLITUDE_LUE: 0
RIGIDITY_RLE: 0
LEG_AGILITY_RIGHT: 0
POSTURAL_TREMOR_LEFTHAND: 0
GAIT: 0
POSTURE: 0
TOETAPPING_LEFT: 0
KINETIC_TREMOR_RIGHTHAND: 1
POSTURAL_TREMOR_RIGHTHAND: 0
HOEHN_YAHR: 0

## 2024-04-16 ASSESSMENT — ENCOUNTER SYMPTOMS
DEPRESSION: 0
LOSS OF SENSATION IN FEET: 0
OCCASIONAL FEELINGS OF UNSTEADINESS: 0

## 2024-04-16 NOTE — PATIENT INSTRUCTIONS
"It was a pleasure seeing you today for essential tremor.     --AtulaTrayah    Please make a follow up appointment at the  or by calling the office in 6 months.     For any urgent issues or questions call [phone], option for doctor's , during our office hours Monday-Friday 8am-4:30pm, and leave a voicemail with your concern.  My office will try to reach back you as soon as possible within 24 (business) hours.  If you have an emergency please call 911 or visit a local urgent care or nearest emergency room.      Please understand that [employer] is a useful communication tool for simple \"normal\" results or a refill request but I would not recommend using this tool for emergent or urgent issues.  I am happy to ask my staff to arrange a follow up visit or a virtual visit sooner than requested with myself or Jordan (Nurse Practitioner), if appropriate for your care.    "

## 2024-04-16 NOTE — PROGRESS NOTES
Subjective     Kavita Jung is a right handed  63 y.o. year old female who presents with tremor.  Visit type: follow up visit     HPI    Patient Health Questionnaire-2 Score: 0         Ms. Jung is a 63 y.o.  RH woman with ET. Tremor is unchanged.  Tremor interferes with sewing and cooking.  Trouble measuring things in the kitchen.  She does not think she needs more treatment necessarily, but had Aes when she previously tried to raise primidone (fatigue).    STM is unchanged from 2019 when she had no neurodegenerative pattern on neuropsych with only mild deficits.    Tremor meds:  Propranolol 40mg 1 BID (higher dose caused headache)  Gabapentin caused headache so she stopped  Primidone 50mg 3 tabs qHS    Prior meds:    She reports a history of kidney stones so cannot take topamax  On higher doses of primidone (up to 300mg qhs) she developed an out of body experience  She has never tried gabapentin      PMHx: No changes    SHx: Lives with , works for - [employer], usually tremors do not affect her work    FHx:   - Brother has PD  - Mom had tremors of the head     Review of Systems  Detailed review of systems is documented in the scanned patient questionnaire and was reviewed with the patient.     Objective   Neurological Exam    Vitals:    04/16/24 0828   BP: 135/83   BP Location: Right arm   Patient Position: Sitting   BP Cuff Size: Adult   Pulse: 67   Resp: 16   Weight: 64 kg (141 lb)       Visit Vitals  /83 (BP Location: Right arm, Patient Position: Sitting, BP Cuff Size: Adult)   Pulse 67   Resp 16   Wt 64 kg (141 lb)   BMI 25.79 kg/m²   Smoking Status Never   BSA 1.67 m²        Well-appearing woman in NAD  AAOx3  bl kinetic tremor LH>RH  Spirals are c/w sinusoidal tremor L>R        Physical Exam       Office Visit from 4/16/2024 in [facility] with Silvestre Mendieta MD    4/16/2024    0850   Motor Examination     Is the patient on medication for treating the symptoms of  Parkinson's Disease? No   Is the patient on Levodopa? No   Speech 0   Facial Expression 0   Rigidty Neck 0   Rigidty RUE 0   Rigidity - LUE 0   Rigidity RLE 0   Rigidity LLE 0   Finger Tapping Right Hand 1   Finger Tapping Left Hand 1   Hand Movements- Right Hand 0   Hand Movements- Left Hand 0   Pronatiaon-Supination Movments - Right Hand 0   Pronatiaon-Supination Movments Left Hand 0   Toe Tapping Right Foot 0   Toe Tapping - Left Foot 0   Leg Agility - Right Leg 0   Leg Agility - Left leg 0   Arising from Chair 0   Gait 0   Freezing of Gait 0   Postural Stability --   Posture 0   Global Spontanteity of Movment ( Body Bradykinesia) 0   Postural Tremor - Right Hand 0   Postural Tremor - Left hand 0   Kinetic Tremor - Right hand 1   Kinetic Tremor - Left hand 1   Rest Tremor Amplitude - RUE 0   Rest Tremor Amplitude - LUE 0   Rest Tremor Amplitude - RLE 0   Rest Tremor Amplitude - LLE 0   Rest Tremor Amplitude - Lip/Jaw 0   Constancy of Rest Tremor 0   MDS UPDRS Total Score --   Were dyskinesias (chorea or dystonia) present during examination? No   Hoen and Yahr Stage 0           Assessment/Plan   Ms. Jung is a 63 YO RH woman with ET on maximum tolerated primidone.  She may be out of treatment options with pills, and she does not feel she wants to raise medication anyway for the mild tremor.  We discussed [employer] as a potential future direction.  Memory is stable chronically.  She does not have any signs of parkinsonism now.  Follow up in 6 months.    CODING and DOCUMENTATION DETERMINATION  For the Evaluation and Management of this patient, the level of Medical Decision Making for this visit was determined based on the following:  The level of COMPLEXITY AND NUMBER OF PROBLEMS ADDRESSED was [HIGH, MODERATE, LOW] as determined by:   MODERATE:  two or more stable chronic illnesses - tremor and memory loss  The level of RISK OF COMPLICATIONS was MODERATE as determined by: MODERATE: prescription drug  management.  Thus, the level of medical decision making (based on the lower of the two highest elements) was determined to be MODERATE

## 2024-07-05 ENCOUNTER — TELEPHONE (OUTPATIENT)
Dept: PRIMARY CARE | Facility: CLINIC | Age: 64
End: 2024-07-05

## 2024-07-05 DIAGNOSIS — N39.0 URINARY TRACT INFECTION WITHOUT HEMATURIA, SITE UNSPECIFIED: Primary | ICD-10-CM

## 2024-07-08 ENCOUNTER — LAB (OUTPATIENT)
Dept: LAB | Facility: LAB | Age: 64
End: 2024-07-08
Payer: COMMERCIAL

## 2024-07-08 DIAGNOSIS — N39.0 URINARY TRACT INFECTION WITHOUT HEMATURIA, SITE UNSPECIFIED: ICD-10-CM

## 2024-07-08 PROCEDURE — 81001 URINALYSIS AUTO W/SCOPE: CPT

## 2024-07-09 LAB
APPEARANCE UR: CLEAR
BILIRUB UR STRIP.AUTO-MCNC: NEGATIVE MG/DL
COLOR UR: COLORLESS
GLUCOSE UR STRIP.AUTO-MCNC: ABNORMAL MG/DL
KETONES UR STRIP.AUTO-MCNC: NEGATIVE MG/DL
LEUKOCYTE ESTERASE UR QL STRIP.AUTO: ABNORMAL
NITRITE UR QL STRIP.AUTO: NEGATIVE
PH UR STRIP.AUTO: 5.5 [PH]
PROT UR STRIP.AUTO-MCNC: NEGATIVE MG/DL
RBC # UR STRIP.AUTO: NEGATIVE /UL
RBC #/AREA URNS AUTO: NORMAL /HPF
SP GR UR STRIP.AUTO: 1.01
UROBILINOGEN UR STRIP.AUTO-MCNC: NORMAL MG/DL
WBC #/AREA URNS AUTO: NORMAL /HPF

## 2024-07-10 ENCOUNTER — TELEPHONE (OUTPATIENT)
Dept: PRIMARY CARE | Facility: CLINIC | Age: 64
End: 2024-07-10

## 2024-07-11 ENCOUNTER — LAB REQUISITION (OUTPATIENT)
Dept: LAB | Facility: HOSPITAL | Age: 64
End: 2024-07-11
Payer: COMMERCIAL

## 2024-07-11 DIAGNOSIS — R35.0 FREQUENCY OF MICTURITION: ICD-10-CM

## 2024-07-11 PROCEDURE — 87086 URINE CULTURE/COLONY COUNT: CPT

## 2024-07-13 LAB — BACTERIA UR CULT: NO GROWTH

## 2024-07-16 ENCOUNTER — APPOINTMENT (OUTPATIENT)
Dept: INTEGRATIVE MEDICINE | Facility: CLINIC | Age: 64
End: 2024-07-16
Payer: COMMERCIAL

## 2024-07-16 DIAGNOSIS — S03.00XA DISLOCATION OF TEMPOROMANDIBULAR JOINT, INITIAL ENCOUNTER: ICD-10-CM

## 2024-07-16 DIAGNOSIS — R25.2 LEG CRAMPING: ICD-10-CM

## 2024-07-16 DIAGNOSIS — N95.1 HOT FLASHES DUE TO MENOPAUSE: ICD-10-CM

## 2024-07-16 DIAGNOSIS — R35.0 INCREASED FREQUENCY OF URINATION: Primary | ICD-10-CM

## 2024-07-16 PROCEDURE — 99214 OFFICE O/P EST MOD 30 MIN: CPT | Performed by: NURSE PRACTITIONER

## 2024-07-16 NOTE — PROGRESS NOTES
Kavita  presents for a new patient visit.     Today we reviewed pillars of Lifestyle Medicine: Sleep restoration, Nutrition, Stress Management, Interpersonal and Social Connection, Avoidance of Risky Behavior. The benefits were discussed for each pillar and how incorporation of changes in lifestyle would benefit the patient and his/her lifestyle choices.     Work: full time  employee Lives with:  and daughter/dog Personal connection level: 8/10    Somatic complaints:   Admits TMJ- has been going on for many years. Going to refer to chiro/acu  Started menopause five years ago, admits hot flashes mostly at night, intense whens he has to go to the bathroom.   Admits bladder issues- repeated kidney stones.   Admits left leg cramps/abel horse, back of knee. Happens once a month.   Goal of the visit: to help with various symptoms    Sleep hygiene: sleeping 6 hours without interruption. Feels tired when she wakes up.   Goes to bed 1145-12 am getting into bed- 6-7.   Sleep hygiene handout given. Suggested tracking for cycles.         Supplements:   Estrovera menopause  Magnesium malate     Nutrition:   AM: banana 1/2;  2 waffles   Snack: yogurt /greek  Lunch: buttered noodles with parsley  Snack : couple pretzles  Dinner: baked chicken breast and potato vegetable medley  AM: banana /muffin  Lunch: salad- MSC  Food log     Plan:   See supplements suggested on fullscript- multi, jose mag vanilla, estrovirase, magenesium   Consider seeing a hormone specialist for BHRT- bioidentical hormone replacement therapy.   Referral to acupuncture- for tmj and and hot flashes.   See sleep hygiene handout   Protein handout/food log    No follow-ups on file.     [email or web address]

## 2024-07-16 NOTE — PATIENT INSTRUCTIONS
Plan:   See supplements suggested on fullscript- multi, jose mag vanilla, estrovirase, magenesium   Consider seeing a hormone specialist for BHRT- bioidentical hormone replacement therapy.   Referral to acupuncture- for tmj and and hot flashes.   See sleep hygiene handout   Protein handout/food log  Follow up with whenever you can get in.

## 2024-07-18 ENCOUNTER — OFFICE VISIT (OUTPATIENT)
Dept: PRIMARY CARE | Facility: CLINIC | Age: 64
End: 2024-07-18
Payer: COMMERCIAL

## 2024-07-18 ENCOUNTER — LAB (OUTPATIENT)
Dept: LAB | Facility: LAB | Age: 64
End: 2024-07-18
Payer: COMMERCIAL

## 2024-07-18 VITALS — DIASTOLIC BLOOD PRESSURE: 75 MMHG | SYSTOLIC BLOOD PRESSURE: 115 MMHG | BODY MASS INDEX: 25.61 KG/M2 | WEIGHT: 140 LBS

## 2024-07-18 DIAGNOSIS — R35.0 INCREASED FREQUENCY OF URINATION: ICD-10-CM

## 2024-07-18 DIAGNOSIS — I10 BENIGN ESSENTIAL HTN: ICD-10-CM

## 2024-07-18 DIAGNOSIS — R25.2 LEG CRAMPING: ICD-10-CM

## 2024-07-18 DIAGNOSIS — K76.0 FATTY LIVER: ICD-10-CM

## 2024-07-18 DIAGNOSIS — R73.02 GLUCOSE INTOLERANCE (IMPAIRED GLUCOSE TOLERANCE): Primary | ICD-10-CM

## 2024-07-18 DIAGNOSIS — S03.00XA DISLOCATION OF TEMPOROMANDIBULAR JOINT, INITIAL ENCOUNTER: ICD-10-CM

## 2024-07-18 DIAGNOSIS — N95.1 HOT FLASHES DUE TO MENOPAUSE: ICD-10-CM

## 2024-07-18 DIAGNOSIS — R73.02 GLUCOSE INTOLERANCE (IMPAIRED GLUCOSE TOLERANCE): ICD-10-CM

## 2024-07-18 LAB
25(OH)D3 SERPL-MCNC: 18 NG/ML (ref 30–100)
ALT SERPL W P-5'-P-CCNC: 19 U/L (ref 7–45)
ANION GAP SERPL CALC-SCNC: 12 MMOL/L (ref 10–20)
BUN SERPL-MCNC: 11 MG/DL (ref 6–23)
CALCIUM SERPL-MCNC: 9.2 MG/DL (ref 8.6–10.6)
CHLORIDE SERPL-SCNC: 101 MMOL/L (ref 98–107)
CHOLEST SERPL-MCNC: 178 MG/DL (ref 0–199)
CHOLESTEROL/HDL RATIO: 4.3
CO2 SERPL-SCNC: 28 MMOL/L (ref 21–32)
CREAT SERPL-MCNC: 0.72 MG/DL (ref 0.5–1.05)
EGFRCR SERPLBLD CKD-EPI 2021: >90 ML/MIN/1.73M*2
EST. AVERAGE GLUCOSE BLD GHB EST-MCNC: 151 MG/DL
GLUCOSE SERPL-MCNC: 277 MG/DL (ref 74–99)
HBA1C MFR BLD: 6.9 %
HDLC SERPL-MCNC: 41.7 MG/DL
LDLC SERPL CALC-MCNC: ABNORMAL MG/DL
NON HDL CHOLESTEROL: 136 MG/DL (ref 0–149)
POTASSIUM SERPL-SCNC: 4.4 MMOL/L (ref 3.5–5.3)
SODIUM SERPL-SCNC: 137 MMOL/L (ref 136–145)
TRIGL SERPL-MCNC: 401 MG/DL (ref 0–149)
VIT B12 SERPL-MCNC: 295 PG/ML (ref 211–911)
VLDL: ABNORMAL

## 2024-07-18 PROCEDURE — 99213 OFFICE O/P EST LOW 20 MIN: CPT | Performed by: INTERNAL MEDICINE

## 2024-07-18 PROCEDURE — 36415 COLL VENOUS BLD VENIPUNCTURE: CPT

## 2024-07-18 PROCEDURE — 80061 LIPID PANEL: CPT

## 2024-07-18 PROCEDURE — 83036 HEMOGLOBIN GLYCOSYLATED A1C: CPT

## 2024-07-18 PROCEDURE — 84460 ALANINE AMINO (ALT) (SGPT): CPT

## 2024-07-18 PROCEDURE — 3078F DIAST BP <80 MM HG: CPT | Performed by: INTERNAL MEDICINE

## 2024-07-18 PROCEDURE — 82306 VITAMIN D 25 HYDROXY: CPT

## 2024-07-18 PROCEDURE — 3074F SYST BP LT 130 MM HG: CPT | Performed by: INTERNAL MEDICINE

## 2024-07-18 PROCEDURE — 80048 BASIC METABOLIC PNL TOTAL CA: CPT

## 2024-07-18 PROCEDURE — 82607 VITAMIN B-12: CPT

## 2024-07-18 NOTE — PROGRESS NOTES
Subjective   Patient ID: Kavita Jung is a 63 y.o. female who presents for No chief complaint on file..    HPI follow-up visit no chest pain or shortness of breath no polyuria polydipsia [facility] of the urgent care had another urinalysis given antibiotics although there was no nitrates in the urine glucose in the urine was still elevated she has a history of kidney stones but none in the past 5 years has been watching her diet discussed with fatty liver lipids and weight    Review of Systems    Objective   There were no vitals taken for this visit.    Physical Exam vital signs noted alert and oriented x 3 NCAT no JVD or bruit chest clear to auscultations regular rate and rhythm S1-S2 abdomen soft nontender normal active bowel sounds extremities no clubbing    Assessment/Plan   Impression glucose intolerance hypertension hyperlipidemia?  Fatty liver  Plan check glycohemoglobin advised on long-term blood sugar test check ALT advised on liver enzymes see prior blood work check lipid panel advised on cholesterol cholesterol medicine as well as triglycerides she has completed the antibiotic course previous TSH was normal  check Chem-7 advised on glucose potassium and kidney function good diet regular exercise increase water consumption advised on medications and recheck based on above

## 2024-07-23 DIAGNOSIS — R25.2 LEG CRAMPING: Primary | ICD-10-CM

## 2024-07-23 DIAGNOSIS — E55.9 VITAMIN D DEFICIENCY: ICD-10-CM

## 2024-07-23 DIAGNOSIS — E53.8 VITAMIN B 12 DEFICIENCY: ICD-10-CM

## 2024-07-23 DIAGNOSIS — R35.0 INCREASED FREQUENCY OF URINATION: ICD-10-CM

## 2024-07-23 RX ORDER — CYANOCOBALAMIN 1000 UG/ML
1000 INJECTION, SOLUTION INTRAMUSCULAR; SUBCUTANEOUS
Qty: 1 ML | Refills: 11 | Status: SHIPPED | OUTPATIENT
Start: 2024-07-23

## 2024-07-23 RX ORDER — ERGOCALCIFEROL 1.25 MG/1
50000 CAPSULE ORAL
Qty: 8 CAPSULE | Refills: 0 | Status: SHIPPED | OUTPATIENT
Start: 2024-07-28 | End: 2024-09-26

## 2024-07-29 ENCOUNTER — TELEPHONE (OUTPATIENT)
Dept: PRIMARY CARE | Facility: CLINIC | Age: 64
End: 2024-07-29

## 2024-07-30 DIAGNOSIS — E53.8 VITAMIN B12 DEFICIENCY: Primary | ICD-10-CM

## 2024-07-30 RX ORDER — SYRINGE-NEEDLE,INSULIN,0.5 ML 27GX1/2"
SYRINGE, EMPTY DISPOSABLE MISCELLANEOUS
Qty: 100 EACH | Refills: 11 | Status: SHIPPED | OUTPATIENT
Start: 2024-07-30 | End: 2025-07-30

## 2024-08-01 DIAGNOSIS — R73.02 GLUCOSE INTOLERANCE (IMPAIRED GLUCOSE TOLERANCE): Primary | ICD-10-CM

## 2024-08-01 RX ORDER — GLIMEPIRIDE 1 MG/1
1 TABLET ORAL
Qty: 30 TABLET | Refills: 1 | Status: SHIPPED | OUTPATIENT
Start: 2024-08-01 | End: 2024-09-30

## 2024-08-08 ENCOUNTER — APPOINTMENT (OUTPATIENT)
Dept: OBSTETRICS AND GYNECOLOGY | Facility: CLINIC | Age: 64
End: 2024-08-08
Payer: COMMERCIAL

## 2024-08-08 ENCOUNTER — HOSPITAL ENCOUNTER (OUTPATIENT)
Dept: RADIOLOGY | Facility: CLINIC | Age: 64
Discharge: HOME | End: 2024-08-08
Payer: COMMERCIAL

## 2024-08-08 VITALS — BODY MASS INDEX: 26.13 KG/M2 | WEIGHT: 141.98 LBS | HEIGHT: 62 IN

## 2024-08-08 VITALS
SYSTOLIC BLOOD PRESSURE: 132 MMHG | WEIGHT: 142 LBS | BODY MASS INDEX: 26.13 KG/M2 | DIASTOLIC BLOOD PRESSURE: 74 MMHG | HEIGHT: 62 IN

## 2024-08-08 DIAGNOSIS — Z12.31 VISIT FOR SCREENING MAMMOGRAM: ICD-10-CM

## 2024-08-08 DIAGNOSIS — Z01.419 ENCOUNTER FOR ANNUAL ROUTINE GYNECOLOGICAL EXAMINATION: Primary | ICD-10-CM

## 2024-08-08 PROCEDURE — 1036F TOBACCO NON-USER: CPT | Performed by: OBSTETRICS & GYNECOLOGY

## 2024-08-08 PROCEDURE — 99396 PREV VISIT EST AGE 40-64: CPT | Performed by: OBSTETRICS & GYNECOLOGY

## 2024-08-08 PROCEDURE — 3075F SYST BP GE 130 - 139MM HG: CPT | Performed by: OBSTETRICS & GYNECOLOGY

## 2024-08-08 PROCEDURE — 77067 SCR MAMMO BI INCL CAD: CPT

## 2024-08-08 PROCEDURE — 3078F DIAST BP <80 MM HG: CPT | Performed by: OBSTETRICS & GYNECOLOGY

## 2024-08-08 PROCEDURE — 3008F BODY MASS INDEX DOCD: CPT | Performed by: OBSTETRICS & GYNECOLOGY

## 2024-08-08 ASSESSMENT — PAIN SCALES - GENERAL: PAINLEVEL: 0-NO PAIN

## 2024-08-08 NOTE — PROGRESS NOTES
63-year-old -0-2-4 postmenopausal  woman presents today for annual GYN exam.    She is without any pelvic pain, abnormal discharge, postmenopausal bleeding or bladder issues.  She has recently been diagnosed with diabetes.  She reports intermittent hot flashes and will follow-up with [facility] for acupuncture to help with her hot flashes.  She has a history of nonalcoholic fatty liver, recently diagnosed.    Subjective   Patient ID: Kavita Jung is a 63 y.o. female who presents for Annual Exam (Last pap 10/20/22 wnl/Mammogram 22).  HPI    Review of Systems    Objective   Physical Exam  Exam conducted with a chaperone present.   Constitutional:       Appearance: She is normal weight.   HENT:      Head: Normocephalic.      Right Ear: External ear normal.      Left Ear: External ear normal.      Nose: Nose normal.      Mouth/Throat:      Mouth: Mucous membranes are moist.   Eyes:      Extraocular Movements: Extraocular movements intact.      Pupils: Pupils are equal, round, and reactive to light.   Cardiovascular:      Rate and Rhythm: Normal rate and regular rhythm.      Heart sounds: Normal heart sounds.   Pulmonary:      Effort: Pulmonary effort is normal.      Breath sounds: Normal breath sounds.   Chest:   Breasts:     Right: Normal.      Left: Normal.   Abdominal:      Palpations: Abdomen is soft.   Genitourinary:     General: Normal vulva.      Labia:         Right: No rash or lesion.         Left: No rash or lesion.       Vagina: Normal.      Cervix: Normal. No cervical motion tenderness.      Uterus: Normal.       Adnexa: Right adnexa normal and left adnexa normal.   Musculoskeletal:         General: Normal range of motion.      Cervical back: Normal range of motion and neck supple.   Skin:     General: Skin is warm and dry.   Neurological:      General: No focal deficit present.      Mental Status: She is alert and oriented to person, place, and time.   Psychiatric:          Mood and Affect: Mood normal.         Behavior: Behavior normal.         A/P: APE     -  Pap due 2027    -  Mammogram     -  PCP F/U     -  [place] info given (but h/o NAFL)

## 2024-08-08 NOTE — PATIENT INSTRUCTIONS
Thanks for coming in today for your annual GYN exam.      Your next Pap smear is due in 2027.  However, please return to the office once a year for your annual GYN exam.      Arrange to have a mammogram performed once a year.      Follow-up with your PCP and other healthcare specialist as needed.

## 2024-08-15 ENCOUNTER — TELEPHONE (OUTPATIENT)
Dept: PRIMARY CARE | Facility: CLINIC | Age: 64
End: 2024-08-15
Payer: COMMERCIAL

## 2024-08-15 DIAGNOSIS — R73.02 GLUCOSE INTOLERANCE (IMPAIRED GLUCOSE TOLERANCE): ICD-10-CM

## 2024-08-17 DIAGNOSIS — E11.9 TYPE 2 DIABETES MELLITUS WITHOUT COMPLICATION, WITHOUT LONG-TERM CURRENT USE OF INSULIN (MULTI): Primary | ICD-10-CM

## 2024-08-22 ENCOUNTER — OFFICE VISIT (OUTPATIENT)
Dept: ENDOCRINOLOGY | Facility: CLINIC | Age: 64
End: 2024-08-22
Payer: COMMERCIAL

## 2024-08-22 VITALS
BODY MASS INDEX: 25.95 KG/M2 | WEIGHT: 141 LBS | SYSTOLIC BLOOD PRESSURE: 136 MMHG | DIASTOLIC BLOOD PRESSURE: 82 MMHG | RESPIRATION RATE: 17 BRPM | TEMPERATURE: 97 F | HEIGHT: 62 IN | HEART RATE: 70 BPM

## 2024-08-22 DIAGNOSIS — I10 BENIGN ESSENTIAL HTN: ICD-10-CM

## 2024-08-22 DIAGNOSIS — E78.1 PURE HYPERTRIGLYCERIDEMIA: ICD-10-CM

## 2024-08-22 DIAGNOSIS — E11.65 TYPE 2 DIABETES MELLITUS WITH HYPERGLYCEMIA, WITHOUT LONG-TERM CURRENT USE OF INSULIN (MULTI): Primary | ICD-10-CM

## 2024-08-22 DIAGNOSIS — K75.81 NASH (NONALCOHOLIC STEATOHEPATITIS): ICD-10-CM

## 2024-08-22 PROCEDURE — 99204 OFFICE O/P NEW MOD 45 MIN: CPT | Performed by: NURSE PRACTITIONER

## 2024-08-22 PROCEDURE — 3008F BODY MASS INDEX DOCD: CPT | Performed by: NURSE PRACTITIONER

## 2024-08-22 PROCEDURE — 1036F TOBACCO NON-USER: CPT | Performed by: NURSE PRACTITIONER

## 2024-08-22 PROCEDURE — 3075F SYST BP GE 130 - 139MM HG: CPT | Performed by: NURSE PRACTITIONER

## 2024-08-22 PROCEDURE — 3044F HG A1C LEVEL LT 7.0%: CPT | Performed by: NURSE PRACTITIONER

## 2024-08-22 PROCEDURE — 3079F DIAST BP 80-89 MM HG: CPT | Performed by: NURSE PRACTITIONER

## 2024-08-22 RX ORDER — TIRZEPATIDE 2.5 MG/.5ML
2.5 INJECTION, SOLUTION SUBCUTANEOUS
Qty: 2 ML | Refills: 11 | Status: SHIPPED | OUTPATIENT
Start: 2024-08-22

## 2024-08-22 ASSESSMENT — ENCOUNTER SYMPTOMS
ACTIVITY CHANGE: 0
NUMBNESS: 0
NAUSEA: 0
WEAKNESS: 0
SEIZURES: 0
FREQUENCY: 0
CONSTIPATION: 0
DIZZINESS: 0
PALPITATIONS: 0
NERVOUS/ANXIOUS: 0
DIARRHEA: 0
SHORTNESS OF BREATH: 0
POLYPHAGIA: 0
POLYDIPSIA: 0
APPETITE CHANGE: 0
SLEEP DISTURBANCE: 0
FATIGUE: 0

## 2024-08-22 ASSESSMENT — PATIENT HEALTH QUESTIONNAIRE - PHQ9
1. LITTLE INTEREST OR PLEASURE IN DOING THINGS: NOT AT ALL
SUM OF ALL RESPONSES TO PHQ9 QUESTIONS 1 AND 2: 0
2. FEELING DOWN, DEPRESSED OR HOPELESS: NOT AT ALL

## 2024-08-22 NOTE — PROGRESS NOTES
Subjective   Kavita Jung is a 63 y.o. female here today for a new patient visit regarding Type 2 diabetes. Initial diagnosis with diabetes was July 2024.  She states she had gestational DM at the ages [age] that required insulin.   Father and two paternal aunts all had diabetes.     She works at  purchasing capital equipment    She saw MD at [facility] and is now taking injectable B 12 and Prescription strength vitamin D.     Known complications include: none    Previously seeing Primary for diabetes care.    A1c 6.6% July 2024, Previous A1c 5.7% July 2023    Historical meds:  Glimepiride 1 mg broke out in rash.     Current diabetes regimen is as follows:   Nothing for diabetes    Patient is not using continuous glucose monitor-   The patient is not currently checking the blood glucose     Hypoglycemia frequency: N/A  Hypoglycemia awareness: N/A     Regarding symptoms of hyperglycemia, the patient is not experiencing symptoms such as polydipsia, nocturia, blurry vision, and unintentional weight loss.     Last foot exam:   Last eye exam: Feb 2024 before diagnosis. Intrusted to obtain retinal exam at next visit.   Last urine albumin: None on record. Labs ordered.   Last LDL: Total 178 with trig 401    Review of Systems   Constitutional:  Negative for activity change, appetite change and fatigue.   Respiratory:  Negative for shortness of breath.    Cardiovascular:  Negative for chest pain, palpitations and leg swelling.   Gastrointestinal:  Negative for constipation, diarrhea and nausea.   Endocrine: Negative for cold intolerance, heat intolerance, polydipsia, polyphagia and polyuria.   Genitourinary:  Negative for frequency.   Musculoskeletal:  Negative for gait problem.   Skin:  Negative for rash.   Neurological:  Negative for dizziness, seizures, weakness and numbness.   Psychiatric/Behavioral:  Negative for sleep disturbance and suicidal ideas. The patient is not nervous/anxious.         Objective   "  Blood pressure 136/82, height 1.575 m (5' 2\"), weight 64 kg (141 lb).  Physical Exam  Vitals and nursing note reviewed.   HENT:      Head: Atraumatic.   Pulmonary:      Effort: Pulmonary effort is normal.   Skin:     General: Skin is warm.   Neurological:      General: No focal deficit present.      Mental Status: She is alert and oriented to person, place, and time. Mental status is at baseline.      Gait: Gait normal.   Psychiatric:         Mood and Affect: Mood normal.         Behavior: Behavior normal.         Thought Content: Thought content normal.         Judgment: Judgment normal.           Lab Results   Component Value Date    BILITOT 0.3 01/23/2024    CALCIUM 9.2 07/18/2024    CO2 28 07/18/2024     07/18/2024    CREATININE 0.72 07/18/2024    GLUCOSE 277 (H) 07/18/2024    ALKPHOS 91 01/23/2024    K 4.4 07/18/2024    PROT 7.6 01/23/2024     07/18/2024    AST 19 01/23/2024    ALT 19 07/18/2024    BUN 11 07/18/2024    ANIONGAP 12 07/18/2024    PHOS 3.2 11/04/2017    ALBUMIN 4.2 01/23/2024    LIPASE 44 01/23/2024    GFRF 83 10/20/2022     Lab Results   Component Value Date    TRIG 401 (H) 07/18/2024    CHOL 178 07/18/2024    LDLCALC  07/18/2024      Comment:      The calculation of LDL and VLDL are inaccurate when the Triglycerides are greater than 400 mg/dL or when the patient is non-fasting. If LDL measurement is necessary contact the testing laboratory for an alternative LDL assay.                                  Near   Borderline      AGE      Desirable  Optimal    High     High     Very High     0-19 Y     0 - 109     ---    110-129   >/= 130     ----    20-24 Y     0 - 119     ---    120-159   >/= 160     ----      >24 Y     0 -  99   100-129  130-159   160-189     >/=190      HDL 41.7 07/18/2024     Lab Results   Component Value Date    HGBA1C 6.9 (H) 07/18/2024    HGBA1C 5.7 (A) 10/20/2022    HGBA1C 5.5 08/02/2018     The 10-year ASCVD risk score (Ashkan ZENG, et al., 2019) is: 12.4%    " Values used to calculate the score:      Age: 63 years      Sex: Female      Is Non- : No      Diabetic: Yes      Tobacco smoker: No      Systolic Blood Pressure: 136 mmHg      Is BP treated: Yes      HDL Cholesterol: 41.7 mg/dL      Total Cholesterol: 178 mg/dL    Assessment/Plan   Problem List Items Addressed This Visit       Benign essential HTN     Other Visit Diagnoses       Type 2 diabetes mellitus with hyperglycemia, without long-term current use of insulin (Multi)    -  Primary    Pure hypertriglyceridemia        ENAMORADO (nonalcoholic steatohepatitis)                Type 2 diabetes mellitus, is not at goal. A1c 6.9% July 2024  RX changes:   Clinical Pharmacy will call you for VBID program in the next 2 weeks.   THEN FILL:   Start Metformin  mg once daily  Start Mounjaro 2.5 mg weekly  We can use a CGM at the next visit and use for titration.   ENAMORADO: use of Mounjaro will improve outcome  Hypertension: At goal. No changes.   Hyperlipidemia: Trig too high to calc LDL. Labs ordered for Dec 2024. Discussed statin and fish oil use  Education:  blood sugar goals  Follow up: I recommend diabetes care be January 2025.

## 2024-08-22 NOTE — PATIENT INSTRUCTIONS
Type 2 diabetes mellitus, is not at goal. A1c 6.9% July 2024  RX changes:   Clinical Pharmacy will call you for VBID program in the next 2 weeks.   THEN FILL:   Start Metformin  mg once daily  Start Mounjaro 2.5 mg weekly  We can use a CGM at the next visit and use for titration.   ENAMORADO: use of Mounjaro will improve outcome  Hypertension: At goal. No changes.   Hyperlipidemia: Trig too high to calc LDL. Labs ordered for Dec 2024. Discussed statin and fish oil use  Education:  blood sugar goals  Follow up: I recommend diabetes care be January 2025.

## 2024-08-23 ENCOUNTER — TELEPHONE (OUTPATIENT)
Dept: OBSTETRICS AND GYNECOLOGY | Facility: CLINIC | Age: 64
End: 2024-08-23
Payer: COMMERCIAL

## 2024-08-23 NOTE — TELEPHONE ENCOUNTER
----- Message from Stefani Lopez sent at 8/23/2024  2:14 AM EDT -----  Normal Mammogram, however dense breast. She should consider a Fast MRI which unfortunately cost $250.

## 2024-08-23 NOTE — TELEPHONE ENCOUNTER
Contacted pt and verified name and .  Pt notified her mammogram test results were normal but dense breast tissue was noted. Fast MRI suggested and refused by pt at this time.  Patient states understanding and has no questions at this time.

## 2024-08-24 RX ORDER — GLIMEPIRIDE 1 MG/1
1 TABLET ORAL
Qty: 30 TABLET | Refills: 1 | Status: SHIPPED | OUTPATIENT
Start: 2024-08-24 | End: 2024-08-28 | Stop reason: ALTCHOICE

## 2024-08-28 ENCOUNTER — APPOINTMENT (OUTPATIENT)
Dept: PHARMACY | Facility: HOSPITAL | Age: 64
End: 2024-08-28
Payer: COMMERCIAL

## 2024-08-28 DIAGNOSIS — E11.65 TYPE 2 DIABETES MELLITUS WITH HYPERGLYCEMIA, WITHOUT LONG-TERM CURRENT USE OF INSULIN (MULTI): Primary | ICD-10-CM

## 2024-08-28 DIAGNOSIS — E11.65 TYPE 2 DIABETES MELLITUS WITH HYPERGLYCEMIA, WITHOUT LONG-TERM CURRENT USE OF INSULIN (MULTI): ICD-10-CM

## 2024-08-28 RX ORDER — METFORMIN HYDROCHLORIDE 500 MG/1
TABLET, EXTENDED RELEASE ORAL
Qty: 90 TABLET | Refills: 3 | Status: SHIPPED | OUTPATIENT
Start: 2024-08-28 | End: 2024-08-30 | Stop reason: SDUPTHER

## 2024-08-28 RX ORDER — GLIMEPIRIDE 1 MG/1
1 TABLET ORAL
Qty: 30 TABLET | Refills: 1 | OUTPATIENT
Start: 2024-08-28 | End: 2024-10-27

## 2024-08-28 NOTE — PROGRESS NOTES
[facility] (ID)    Kavita Jung is a 63 y.o. female referred to Clinical Pharmacy Team to complete a comprehensive medication review (CMR) with a pharmacist as part of the Value Based Insurance Design (VBID) diabetes program.  Pharmacy team may also provide assistance in diabetes management per discussion with referring provider and/or endocrinology. Referring Provider: Devora Hernandez APR*    Does patient follow with Endocrinology: Yes  Endocrinology Provider Name: ZENON Hernandez    Subjective   Allergies   Allergen Reactions    Glimepiride Hives and Rash     Pt reports severe topical rash/hives with glimepiride after 4 days of use, unable to tolerate   Reviewed by Sheba Nguyen, PharmD 08/28/24      [facility] #[id number] - [place], [place] - [address]  [address]  [place] [address]  Phone: [phone] Fax: [phone]    [facility] - [place]AN [place] - [address] AT [address] [facility]  [place] [address]  Phone: [phone] Fax: [phone]      Diabetes  She presents for her initial diabetic visit. She has type 2 diabetes mellitus. The initial diagnosis of diabetes was made 2 months (new diagnosis, hx of gestational diabetes) ago. There are no hypoglycemic associated symptoms. There are no hypoglycemic complications. Risk factors for coronary artery disease include diabetes mellitus, hypertension, post-menopausal and dyslipidemia. When asked about current treatments, none (intended to start Mounjaro and Metformin) were reported. An ACE inhibitor/angiotensin II receptor blocker is not being taken. Eye exam is current.       Pertinent PMH Review:  PMH of Pancreatitis: No  PMH of Retinopathy: No - last eye exam, Jan 2024  PMH of Urinary Tract Infections: No  PMH of MTC: No    HOME MONITORING  Monitoring Device: none  Monitoring Frequency: not currently monitoring    Current home  "BG readings: n/a   Previous home BG readings: n/a  Any episodes of hypoglycemia? No, denies .      Objective     LAB  Lab Results   Component Value Date    BILITOT 0.3 01/23/2024    CALCIUM 9.2 07/18/2024    CO2 28 07/18/2024     07/18/2024    CREATININE 0.72 07/18/2024    GLUCOSE 277 (H) 07/18/2024    ALKPHOS 91 01/23/2024    K 4.4 07/18/2024    PROT 7.6 01/23/2024     07/18/2024    AST 19 01/23/2024    ALT 19 07/18/2024    BUN 11 07/18/2024    ANIONGAP 12 07/18/2024    PHOS 3.2 11/04/2017    ALBUMIN 4.2 01/23/2024    LIPASE 44 01/23/2024    GFRF 83 10/20/2022     Lab Results   Component Value Date    TRIG 401 (H) 07/18/2024    CHOL 178 07/18/2024    LDLCALC  07/18/2024      Comment:      The calculation of LDL and VLDL are inaccurate when the Triglycerides are greater than 400 mg/dL or when the patient is non-fasting. If LDL measurement is necessary contact the testing laboratory for an alternative LDL assay.                                  Near   Borderline      AGE      Desirable  Optimal    High     High     Very High     0-19 Y     0 - 109     ---    110-129   >/= 130     ----    20-24 Y     0 - 119     ---    120-159   >/= 160     ----      >24 Y     0 -  99   100-129  130-159   160-189     >/=190      HDL 41.7 07/18/2024     Lab Results   Component Value Date    HGBA1C 6.9 (H) 07/18/2024     Estimated body mass index is 25.79 kg/m² as calculated from the following:    Height as of 8/22/24: 1.575 m (5' 2\").    Weight as of 8/22/24: 64 kg (141 lb).     Current Outpatient Medications on File Prior to Visit   Medication Sig Dispense Refill    clotrimazole-betamethasone (Lotrisone) cream APPLY THIN COAT TO AFFECTED AREA TWICE A DAY IN THE MORNING AND IN THE EVENING      cyanocobalamin (Vitamin B-12) 1,000 mcg/mL injection Inject 1 mL (1,000 mcg) into the muscle every 30 (thirty) days. 1 mL 11    ergocalciferol (Vitamin D-2) 1.25 MG (69912 UT) capsule Take 1 capsule (50,000 Units) by mouth 1 (one) " "time per week. 8 capsule 0    ferrous sulfate (IRON ORAL) Take 1 tablet by mouth once daily. With food      multivitamin (MULTIPLE VITAMINS ORAL) Take 1 tablet by mouth once daily.      primidone (Mysoline) 50 mg tablet Take 3 tablets (150 mg) by mouth once daily at bedtime. 270 tablet 2    propranolol (Inderal) 40 mg tablet Take 1 tablet (40 mg) by mouth 2 times a day. 180 tablet 2    insulin syringe-needle U-100 1 mL 30 gauge x 1/2\" syringe Use to inject 1 time IM, once a week as directed for Vit B12 injection 100 each 11    tirzepatide (Mounjaro) 2.5 mg/0.5 mL pen injector Inject 2.5 mg under the skin every 7 days. 2 mL 11    [DISCONTINUED] cholecalciferol (Vitamin D3) 50 mcg (2,000 unit) capsule Take 1 capsule (50 mcg) by mouth once daily.      [DISCONTINUED] cyclobenzaprine (Flexeril) 5 mg tablet Take by mouth.      [DISCONTINUED] diclofenac sodium (Voltaren) 1 % gel Place 2.25 inches (2 g) on the skin once daily.      [DISCONTINUED] dicyclomine (Bentyl) 10 mg capsule Take by mouth.      [DISCONTINUED] diphenoxylate-atropine (Lomotil) 2.5-0.025 mg tablet Take by mouth.      [DISCONTINUED] glimepiride (AmaryL) 1 mg tablet Take 1 tablet (1 mg) by mouth once daily in the morning. Take before meals. 30 tablet 1    [DISCONTINUED] glimepiride (AmaryL) 1 mg tablet Take 1 tablet (1 mg) by mouth once daily in the morning. Take before meals. 30 tablet 1    [DISCONTINUED] HYDROcodone-acetaminophen (Norco) 5-325 mg tablet Take 1 tablet by mouth every 4 hours if needed.      [DISCONTINUED] vibegron (Gemtesa) 75 mg tablet Take by mouth.       No current facility-administered medications on file prior to visit.        MEDICATION RECONCILIATION  Added: none  Changed:   Mounjaro - not yet started  Removed:   Clotrimazole-betamethasone cream - pt not taking, therapy completed, historical med  Cyclobenzaprine - pt not taking, therapy completed, historical med  Diclofenac sodium gel - pt not taking, therapy completed, historical " med  Dicyclomine - pt not taking, therapy completed, historical med  Cholecalciferol (D3) 50 mcg cap - pt not taking, replaced by alt therapy (ergocalciferol 50,000 units)  Hydrocodone-acetaminophen - pt not taking, therapy completed  Vibegron - pt not taking, discontinued d/t side effects, historical med    Drug Interactions:  None warranting change in therapy at this time      CURRENT DIABETES PHARMACOTHERAPY  Mounjaro 2.5 mg injected subcutaneously once weekly - Not yet started  Metformin 500 mg XR - take one tablet (500 mg) PO daily with food - Not yet started    HISTORICAL PHARMACOTHERAPY (if relevant)  Glimepiride - unable to tolerate, pt reports sever rash    SECONDARY PREVENTION  Statin? no  LDL: unable to assess d/t high TGs - no TG therapy started yet  ACE-I/ARB? no  BP: not at goal, < 130/80  UACR:  unable to assess - labs ordered  Aspirin?  no    Assessment/Plan   Problem List Items Addressed This Visit    None  Visit Diagnoses       Type 2 diabetes mellitus with hyperglycemia, without long-term current use of insulin (Multi)        Relevant Orders    Follow Up In Clinical Pharmacy           Diabetes:  Patient is a newly diagnosed Type 2 diabetic with most recent A1c of 6.9% on 7/18/24 (Goal < 7%). Per endo note on 8/22/24, plan to initiate Mounjaro 2.5 mg once weekly and Metformin  mg once daily. Discussed medications, MOA, therapy expectations and possible side effects and answered all pt questions. Plan to keep patient at low dose of Mounjaro due to BMI of 25.75 kg/m^2.  Concerns with DM regimen:  needs optimization, prescriptions need resent to preferred  Pharmacy  Pt understands $0 copay for VBID program will not be active for 2 weeks and medications must be sent to  Pharmacy. Discussed with endo provider and resent to [facility] pharmacy for home delivery.   Start:  Mounjaro 2.5 mg injected subcutaneously once weekly (expected 9/11/24)  Metformin  mg PO daily (expected  9/11/24)  Secondary prevention: needs optimization  Future considerations:  Statin and fish oil - discussed at last endocrinology appt  ACEi/ARB - pending UACR/repeat lab work/ HTN    Comprehensive Medication Review:  Reviewed all medications on patient medication list in EMR. Discussed indication, administration, MOA and possible side effects to medications. Answered all patient questions and concerns.   Patient denies side effects with medications.   Adherence is expected to be Good.   Concerns with medication list:  extensive changes made to med list prior to appt, reflects most accurate/up to date med list now. No concerns w/ current regimen.    VBID Employee Diabetes Program Enrollment: New Enrollment  Patient enrolled in  Employee diabetes program for $0 co-pays on diabetes medications/supplies. New Enrollment enrollment should be active in 2-4 weeks.  Requested VBID enrollment date: 8/28/24  PharmD Management Level: Level 1 (pending)   Pharmacy fill location: [facility] - Home delivery      Follow up: 1 month, 9/25/24      Andreas StevensD     Continue all meds under the continuation of care with the referring provider and clinical pharmacy team. Patient/Caregiver was informed they may decline to participate or withdraw from participation in pharmacy services at any time.

## 2024-08-28 NOTE — PATIENT INSTRUCTIONS
Thank you for entrusting your care to the Clinical Pharmacy Team! We look forward to seeing you again soon.  Please call your clinical pharmacist with any questions or concerns.    Today we completed the requirements for enrollment in the [facility] Employee Value Based Insurance Design (VBID) program. This program allows you to receive for $0 co-pays on diabetes medications/supplies.    This benefit will be active in 2-4 weeks. Once active, [facility] pharmacy will be calling you to set up Home Delivery of your medications/supplies.     To maintain your eligibility for this program, you must:  Maintain  employee insurance coverage  Fill prescriptions at a  pharmacy for pick-up or home delivery  Complete a visit with a clinical pharmacist at least once annually    Sheba Nguyen, PharmD  P: [phone]    To schedule an appointment, please contact:  P: [phone]

## 2024-08-29 ENCOUNTER — EVALUATION (OUTPATIENT)
Dept: PHYSICAL THERAPY | Facility: CLINIC | Age: 64
End: 2024-08-29
Payer: COMMERCIAL

## 2024-08-29 ENCOUNTER — APPOINTMENT (OUTPATIENT)
Dept: INTEGRATIVE MEDICINE | Facility: CLINIC | Age: 64
End: 2024-08-29
Payer: COMMERCIAL

## 2024-08-29 DIAGNOSIS — R23.2 HOT FLASHES: Primary | ICD-10-CM

## 2024-08-29 DIAGNOSIS — R35.0 INCREASED FREQUENCY OF URINATION: ICD-10-CM

## 2024-08-29 DIAGNOSIS — M26.609 TMJ (TEMPOROMANDIBULAR JOINT SYNDROME): ICD-10-CM

## 2024-08-29 PROCEDURE — 97811 ACUP 1/> W/O ESTIM EA ADD 15: CPT | Performed by: ACUPUNCTURIST

## 2024-08-29 PROCEDURE — 99202 OFFICE O/P NEW SF 15 MIN: CPT | Performed by: ACUPUNCTURIST

## 2024-08-29 PROCEDURE — 97110 THERAPEUTIC EXERCISES: CPT | Mod: GP | Performed by: PHYSICAL THERAPIST

## 2024-08-29 PROCEDURE — 97810 ACUP 1/> WO ESTIM 1ST 15 MIN: CPT | Performed by: ACUPUNCTURIST

## 2024-08-29 PROCEDURE — 97161 PT EVAL LOW COMPLEX 20 MIN: CPT | Mod: GP | Performed by: PHYSICAL THERAPIST

## 2024-08-29 ASSESSMENT — ENCOUNTER SYMPTOMS
DEPRESSION: 0
LOSS OF SENSATION IN FEET: 0
OCCASIONAL FEELINGS OF UNSTEADINESS: 0

## 2024-08-29 NOTE — PROGRESS NOTES
Physical Therapy    PELVIC FLOOR EVALUATION AND TREATMENT    Name: Kavita Jung  MRN: [id number]  : 1960  Today's Date: 24     Time Calculation  Start Time: 910  Stop Time: 1015  Time Calculation (min): 65 min    PT Evaluation Time Entry  PT Evaluation (Low) Time Entry: 30  PT Therapeutic Procedures Time Entry  Therapeutic Exercise Time Entry: 15       Visit: 1  Insurance:  Employee  Auth: Yes    Assessment:  Pt presents to clinic with chief complaint of urinary frequency. She does endorse rare urge UI. Pt states symptoms have been ongoing for multiple years and have worsened over the last year. She demonstrates reduced hip mobility, reduced hip strength, and mild postural deficits. Based on current symptoms as well as mobility impairments, tension could be a contributor to frequency. Pt will benefit from skilled therapy to address current deficits for improved bladder habits and control         Plan:   Treatment/Interventions: Education/ Instruction, Hot pack, Manual therapy, Neuromuscular re-education, Self care/ home management, Therapeutic activities, Therapeutic exercises  PT Plan: Skilled PT  PT Frequency: 1 time per week  Duration: 4-6 weeks  Rehab Potential: Good  Plan of Care Agreement: Patient      Current Problem:    Problem List Items Addressed This Visit             ICD-10-CM    Increased frequency of urination R35.0    Relevant Orders    Follow Up In Physical Therapy         Subjective   General  Reason for Referral: Urinary frequency  Referred By: Silva Mccarthy CNP  Preferred Learning Style: verbal, visual, written  Precautions  STEADI Fall Risk Score (The score of 4 or more indicates an increased risk of falling): 0  Precautions Comment: None    Objective   PELVIC HISTORY:    Chief Complaint/Description of Symptoms:   Pt presents to clinic with chief complaint of increased urinary frequency  She does endorse urge UI as well       HPI:   Pt states symptoms have been ongoing for  "multiple years   Over the last couple of months, pt reports symptoms have worsened  Has attempted OAB medications, however, she had too many side effects and did not continue to take     Home Environment/Social Factors/Occupation:      3 vaginal births, last child was     Hx of gallbladder removal about 9 years ago   Recent dx of DM type 2; will be starting Metformin     PELVIC PAIN:     Pelvic pressure when bladder is full   No pain baseline otherwise     Since onset, the symptoms are: worsening   Pain when emptying bladder: No  Pain with wiping or tight clothing: no   Pain with intercourse: No   History of back pain: No     EXERCISE:  Do you do Kegels? No    Current exercise regime:     BLADDER:     Excessive Urinary Urgency: Yes   Daytime Voiding Frequency: up to 12x/day or more   Nighttime Voiding Frequency: 1-3x/night   Unintentional urine loss frequency: once in a while   Leakage occurs with: significant urgency    Leakage amount: small to moderate   Difficulty initiating flow of urine: No   Slow/weak urine stream: Yes   Difficulty starting urine stream/push to urinate: No   Able to completely empty bladder: \"I feel like I can empty\"  Tests performed by doctor: No   Frequent UTI's: No     BOWEL:     Excessive Bowel Urgency: sometimes   BM Frequency: multiple times per day; up to 4-5x/day   Frequent Diarrhea: about every other week   Frequent constipation/straining/incomplete emptying: sometimes straining, incomplete emptying and constipation   [place] Stool Scale rating: Type 4-5    Unintentional stool loss: No       GOAL FOR PFPT:  Reduce urinary frequency       POSTURE:   Rounded shoulder posturing  Increased thoracic kyphosis  Reduced lumbar lordosis in standing  Mild tenderness to PSIS bilat in standing     Pelvic crest height symmetric        ROM R/L:  Lumbar flex: 70 deg; limited by hamstring length   Lumbar ext: 10 deg   Lumbar side bend: 20 deg / 20 deg   Lumbar rotation: 75% / 100% "     Stretch with R rotation     Hip flex: WNL/WNL  Hip IR: mod limitation/moderate limitation  Hip ER: moderate limitation/moderate limitation     STRENGTH  R/L     Hip flexion: 5/5   Hip abd: 5/5   Hip add: 5/5   Hip ER: 4+/4+   Hip IR: 5/5      Quadriceps: 5/5   Hamstrings: 4+/4+      OUTCOMES MEASURE:  Female [facility] Chronic Prostatitis symptom index: 16      Education:  PF anatomy  Role of PFPT  Assessment of pelvic floor     TREATMENT  Therapeutic exercise:  Dolores   Reverse dolores    Self-care:  Review of bladder diary  Instruction on bladder diary       Careplan Goals:  Problem: PT Problem       Goal: Pt will be independent in urgency control techniques for improved bladder control and reduced frequency           Goal: Pt will reduce nocturia to 1-2x/night for improved sleep pattern          Goal: Pt will increase hip mobility bilat for improved hip and pelvic floor flexibility           Goal: Pt will increase bilateral LE strength by 1 MMT grade for all weakened muscle groups for improved pelvic and LE stability           Goal: Pt will be independent in HEP for home maintenance            Kaleb Hayes, PT

## 2024-08-29 NOTE — PROGRESS NOTES
Acupuncture Visit:     Subjective   Patient ID: Kavita Jung is a 63 y.o. female who presents for No chief complaint on file.   insurance  1    TMJ  Hot flashes    TMJ   States she has had x 10 yrs  Notes she used to have ha from stress and grinding, used to wake up with ha  States she grinds, wears a , bottom teeth fitting.  Feels tension BL jaw when opens mouth.  Worse hard food and chewing for a long time.  Jaw tension not waking her up.  Notes 2 implants lower jaw.    Hot flashes  States she has been having hot flashes x 4 yrs during the day, 2-3 times, 1-2x/day   Night flashes more predominant that day flashes.  Notes she does wake up with urge to urinate then feels a hot sensation rising  Notes she has 4 children, last child is 22 yo.    Denies taking hormones.     Med hx includes essential tremor, jaw tremor,  cholecystectomy 10 + yrs due to gallstones, fallopian tubes tied after  with 4th child  HTN, recent diagnosis of DM II (was taking for 3 days d/t side effects)            Session Information  Is this acupuncture treatment being billed to the patient's insurance company: Yes  This is visit number: 1  The patient has a total number of visits of: 20  Initial Acupuncture Treatment date: 24  Name of Insurance Company:  insurance  Visit Type: New patient         Review of Systems         Provider reviewed plan for the acupuncture session, precautions and contraindications. Patient/guardian/hospital staff has given consent to treat with full understanding of what to expect during the session. Before acupuncture began, provider explained to the patient to communicate at any time if the procedure was causing discomfort past their tolerance level. Patient agreed to advise acupuncturist. The acupuncturist counseled the patient on the risks of acupuncture treatment including pain, infection, bleeding, and no relief of pain. The patient was positioned comfortably. There was no  evidence of infection at the site of needle insertions.    Objective   Physical Exam              Acupuncture Treatment  Body Points - Left: 88.12-4, SP 6-4  Body Points - Bilateral: Kd 6, Hrt 5-6, 77.22-23, ST 7, SI 19  Needle Count In: 19  Needle Count Out: 19  Total Face to Face Time (min): 25 minutes              Assessment/Plan

## 2024-08-30 RX ORDER — TIRZEPATIDE 2.5 MG/.5ML
2.5 INJECTION, SOLUTION SUBCUTANEOUS
Qty: 2 ML | Refills: 11 | Status: SHIPPED | OUTPATIENT
Start: 2024-09-10

## 2024-08-30 RX ORDER — METFORMIN HYDROCHLORIDE 500 MG/1
TABLET, EXTENDED RELEASE ORAL
Qty: 90 TABLET | Refills: 3 | Status: SHIPPED | OUTPATIENT
Start: 2024-08-30

## 2024-09-03 PROCEDURE — RXMED WILLOW AMBULATORY MEDICATION CHARGE

## 2024-09-04 ENCOUNTER — PHARMACY VISIT (OUTPATIENT)
Dept: PHARMACY | Facility: CLINIC | Age: 64
End: 2024-09-04
Payer: COMMERCIAL

## 2024-09-12 ENCOUNTER — TREATMENT (OUTPATIENT)
Dept: PHYSICAL THERAPY | Facility: CLINIC | Age: 64
End: 2024-09-12
Payer: COMMERCIAL

## 2024-09-12 DIAGNOSIS — R35.0 INCREASED FREQUENCY OF URINATION: Primary | ICD-10-CM

## 2024-09-12 PROCEDURE — 97140 MANUAL THERAPY 1/> REGIONS: CPT | Mod: GP | Performed by: PHYSICAL THERAPIST

## 2024-09-12 PROCEDURE — 97110 THERAPEUTIC EXERCISES: CPT | Mod: GP | Performed by: PHYSICAL THERAPIST

## 2024-09-12 PROCEDURE — 97535 SELF CARE MNGMENT TRAINING: CPT | Mod: GP | Performed by: PHYSICAL THERAPIST

## 2024-09-12 NOTE — PROGRESS NOTES
Physical Therapy    PELVIC FLOOR  TREATMENT    Name: Kavita Jung  MRN: [id number]  : 1960  Today's Date: 24     Time Calculation  Start Time: 910  Stop Time:   Time Calculation (min): 55 min       PT Therapeutic Procedures Time Entry  Manual Therapy Time Entry: 30  Therapeutic Exercise Time Entry: 10  Self-Care/Home Mgmt Training: 15       Visit: 2  Insurance:  Employee  Auth: Yes    Assessment:  Internal examination performed today with informed pt consent; pt demonstrates increased tension at pelvic floor with associated reduced strength.   Added flexibility work to HEP       Plan:   Half kneeling flexibility  Figure 4 stretch, clamshelwallace  Child's pose, cat cow       Current Problem:    Problem List Items Addressed This Visit             ICD-10-CM    Increased frequency of urination - Primary R35.0           Subjective   Pt reports overall feeling pretty much the same; has started DMII medication        Objective     POSTURE:   Rounded shoulder posturing  Increased thoracic kyphosis  Reduced lumbar lordosis in standing  Mild tenderness to PSIS bilat in standing     Pelvic crest height symmetric        ROM R/L:  Lumbar flex: 70 deg; limited by hamstring length   Lumbar ext: 10 deg   Lumbar side bend: 20 deg / 20 deg   Lumbar rotation: 75% / 100%     Stretch with R rotation     Hip flex: WNL/WNL  Hip IR: mod limitation/moderate limitation  Hip ER: moderate limitation/moderate limitation     STRENGTH  R/L     Hip flexion: 5/5   Hip abd: 5/5   Hip add: 5/5   Hip ER: 4+/4+   Hip IR: 5/5      Quadriceps: 5/5   Hamstrings: 4+/4+      EXTERNAL OBSERVATION:  Voluntary Contraction: present   Voluntary Relaxation: incomplete  Involuntary Contraction: present  Involuntary Relaxation: incomplete         INTERNAL VAGINAL EXAMINATION:  PFM Strength: 2/5  Coordination: NT     INTERNAL PALPATION:   No pain, tenderness with palpation  Tension noted with internal palpation at levator ani group  bilat    EXTERNAL PALPATION:  Levator Ani: Mod Pain/Tightness R, Mod Pain/Tightness L  Bulbo: Mild Pain/Tightness R, Mild Pain/Tightness L  Ischiocavernosus: Mild Pain/Tightness R, Mild Pain/Tightness L  Transverse perineum: Mild Pain/Tightness R, Mild Pain/Tightness L      TREATMENT  Manual therapy:  Internal examination performed with informed pt consent     Therapeutic exercise:  Butterfly stretch  Happy baby stretch     Review-   Dolores   Reverse dolores      Self-care:  Review of bladder diary   Review and instruction on urgency control techniques, bladder retraining, and potential bladder irritants       Careplan Goals:  Problem: PT Problem       Goal: Pt will be independent in urgency control techniques for improved bladder control and reduced frequency           Goal: Pt will reduce nocturia to 1-2x/night for improved sleep pattern          Goal: Pt will increase hip mobility bilat for improved hip and pelvic floor flexibility           Goal: Pt will increase bilateral LE strength by 1 MMT grade for all weakened muscle groups for improved pelvic and LE stability           Goal: Pt will be independent in HEP for home maintenance            Kaleb Hayes, PT

## 2024-09-16 ENCOUNTER — PATIENT MESSAGE (OUTPATIENT)
Dept: ENDOCRINOLOGY | Facility: CLINIC | Age: 64
End: 2024-09-16
Payer: COMMERCIAL

## 2024-09-16 DIAGNOSIS — E11.65 TYPE 2 DIABETES MELLITUS WITH HYPERGLYCEMIA, WITHOUT LONG-TERM CURRENT USE OF INSULIN: Primary | ICD-10-CM

## 2024-09-16 PROCEDURE — RXMED WILLOW AMBULATORY MEDICATION CHARGE

## 2024-09-16 RX ORDER — DEXTROSE 4 G
TABLET,CHEWABLE ORAL
Qty: 1 EACH | Refills: 0 | Status: SHIPPED | OUTPATIENT
Start: 2024-09-16

## 2024-09-16 RX ORDER — BLOOD-GLUCOSE METER
EACH MISCELLANEOUS
Qty: 100 STRIP | Refills: 3 | Status: SHIPPED | OUTPATIENT
Start: 2024-09-16

## 2024-09-16 NOTE — PATIENT COMMUNICATION
Patient has diarrhea, several times a day. Patient does not have gall bladder. Patient is losing weight (1lb) daily. Would like advice on what to eat or drink daily. Patient does not want to stay home just to be near a toilet. Patient has bilateral rib pain. Patient has been on medication for one week.

## 2024-09-18 ENCOUNTER — PHARMACY VISIT (OUTPATIENT)
Dept: PHARMACY | Facility: CLINIC | Age: 64
End: 2024-09-18
Payer: COMMERCIAL

## 2024-09-25 ENCOUNTER — APPOINTMENT (OUTPATIENT)
Dept: PHARMACY | Facility: HOSPITAL | Age: 64
End: 2024-09-25
Payer: COMMERCIAL

## 2024-09-25 DIAGNOSIS — E11.65 TYPE 2 DIABETES MELLITUS WITH HYPERGLYCEMIA, WITHOUT LONG-TERM CURRENT USE OF INSULIN: Primary | ICD-10-CM

## 2024-09-25 PROCEDURE — RXMED WILLOW AMBULATORY MEDICATION CHARGE

## 2024-09-25 RX ORDER — LANCETS 33 GAUGE
EACH MISCELLANEOUS
Qty: 100 EACH | Refills: 11 | Status: SHIPPED | OUTPATIENT
Start: 2024-09-25

## 2024-09-25 RX ORDER — ISOPROPYL ALCOHOL 70 ML/100ML
SWAB TOPICAL
Qty: 200 EACH | Refills: 11 | Status: SHIPPED | OUTPATIENT
Start: 2024-09-25

## 2024-09-25 NOTE — PROGRESS NOTES
[facility] ([place])    Kavita Jung is a 63 y.o. female referred to Clinical Pharmacy Team to complete a comprehensive medication review (CMR) with a pharmacist as part of the [employer] ([employer]) diabetes program.  Pharmacy team may also provide assistance in diabetes management per discussion with referring provider and/or endocrinology. Referring Provider: Devora Hernandez, APR*    Does patient follow with Endocrinology: Yes  Endocrinology Provider Name: ZENON Hernandez    Subjective   Allergies   Allergen Reactions    Metformin Diarrhea    Glimepiride Hives and Rash     Pt reports severe topical rash/hives with glimepiride after 4 days of use, unable to tolerate   Reviewed by Sheba Nguyen, PharmD 09/25/24    Diabetes  She presents for her follow-up diabetic visit. She has type 2 diabetes mellitus. The initial diagnosis of diabetes was made 3 months (new diagnosis, hx of gestational diabetes) ago. There are no hypoglycemic associated symptoms. There are no hypoglycemic complications. Risk factors for coronary artery disease include diabetes mellitus, hypertension, post-menopausal and dyslipidemia. Current diabetic treatment includes oral agent (monotherapy) (Metformin). An ACE inhibitor/angiotensin II receptor blocker is not being taken. Eye exam is current.     Pertinent PMH Review:  PMH of Pancreatitis: No  PMH of Retinopathy: No - last eye exam, Jan 2024  PMH of Urinary Tract Infections: No  PMH of MTC: No    HOME MONITORING  Monitoring Device: OneTouch Verio Flex meter, manual glucometer  Monitoring Frequency: daily, PRN    Current home BG readings: BG log not present at today's visit   Previous home BG readings: not monitoring  Any episodes of hypoglycemia? No, denies .      Objective     LAB  Lab Results   Component Value Date    BILITOT 0.3 01/23/2024    CALCIUM 9.2 07/18/2024    CO2 28 07/18/2024     07/18/2024    CREATININE 0.72 07/18/2024     "GLUCOSE 277 (H) 07/18/2024    ALKPHOS 91 01/23/2024    K 4.4 07/18/2024    PROT 7.6 01/23/2024     07/18/2024    AST 19 01/23/2024    ALT 19 07/18/2024    BUN 11 07/18/2024    ANIONGAP 12 07/18/2024    PHOS 3.2 11/04/2017    ALBUMIN 4.2 01/23/2024    LIPASE 44 01/23/2024    GFRF 83 10/20/2022     Lab Results   Component Value Date    TRIG 401 (H) 07/18/2024    CHOL 178 07/18/2024    LDLCALC  07/18/2024      Comment:      The calculation of LDL and VLDL are inaccurate when the Triglycerides are greater than 400 mg/dL or when the patient is non-fasting. If LDL measurement is necessary contact the testing laboratory for an alternative LDL assay.                                  Near   Borderline      AGE      Desirable  Optimal    High     High     Very High     0-19 Y     0 - 109     ---    110-129   >/= 130     ----    20-24 Y     0 - 119     ---    120-159   >/= 160     ----      >24 Y     0 -  99   100-129  130-159   160-189     >/=190      HDL 41.7 07/18/2024     Lab Results   Component Value Date    HGBA1C 6.9 (H) 07/18/2024     Estimated body mass index is 25.79 kg/m² as calculated from the following:    Height as of 8/22/24: 1.575 m (5' 2\").    Weight as of 8/22/24: 64 kg (141 lb).     Current Outpatient Medications on File Prior to Visit   Medication Sig Dispense Refill    blood sugar diagnostic (OneTouch Verio test strips) strip Check once in the morning before breakfast then 1-2 hours after largest meal of the day 100 strip 3    blood-glucose meter (OneTouch Verio Flex meter) misc Use to check glucose as directed. 1 each 0    clotrimazole-betamethasone (Lotrisone) cream APPLY THIN COAT TO AFFECTED AREA TWICE A DAY IN THE MORNING AND IN THE EVENING      cyanocobalamin (Vitamin B-12) 1,000 mcg/mL injection Inject 1 mL (1,000 mcg) into the muscle every 30 (thirty) days. 1 mL 11    ergocalciferol (Vitamin D-2) 1.25 MG (82503 UT) capsule Take 1 capsule (50,000 Units) by mouth 1 (one) time per week. 8 " "capsule 0    ferrous sulfate (IRON ORAL) Take 1 tablet by mouth once daily. With food      insulin syringe-needle U-100 1 mL 30 gauge x 1/2\" syringe Use to inject 1 time IM, once a week as directed for Vit B12 injection 100 each 11    multivitamin (MULTIPLE VITAMINS ORAL) Take 1 tablet by mouth once daily.      primidone (Mysoline) 50 mg tablet Take 3 tablets (150 mg) by mouth once daily at bedtime. 270 tablet 2    propranolol (Inderal) 40 mg tablet Take 1 tablet (40 mg) by mouth 2 times a day. 180 tablet 2    tirzepatide (Mounjaro) 2.5 mg/0.5 mL pen injector Inject 2.5 mg under the skin every 7 days. Do not fill before September 10, 2024. 2 mL 11    [DISCONTINUED] metFORMIN XR (Glucophage-XR) 500 mg 24 hr tablet Take on tablet daily 90 tablet 3     No current facility-administered medications on file prior to visit.      MEDICATION RECONCILIATION  Added: none  Changed:   Mounjaro - not yet started  Removed:   Metformin - discontinued d/t side effects    Drug Interactions:  None warranting change in therapy at this time    CURRENT DIABETES PHARMACOTHERAPY  Mounjaro 2.5 mg injected subcutaneously once weekly - Not yet started    HISTORICAL PHARMACOTHERAPY (if relevant)  Glimepiride - unable to tolerate, pt reports severe rash  Metformin 500 mg XR - take one tablet (500 mg) PO daily with food     SECONDARY PREVENTION  Statin? no  LDL: unable to assess d/t high TGs - no TG therapy started yet  ACE-I/ARB? no  BP: not at goal, < 130/80  UACR:  unable to assess - labs ordered  Aspirin?  no    VBID Employee Diabetes Program Enrollment: Enrollment up to date  Patient enrolled in  Employee diabetes program for $0 co-pays on diabetes medications/supplies.  Requested VBID enrollment date: 8/28/24  PharmD Management Level: Level 3-4   Pharmacy fill location: [facility] - Home delivery    Assessment/Plan   Problem List Items Addressed This Visit    None  Visit Diagnoses       Type 2 diabetes mellitus with " hyperglycemia, without long-term current use of insulin (Multi)    -  Primary    Relevant Medications    alcohol swabs pads, medicated    lancets (OneTouch Delica Plus Lancet) 33 gauge misc    Other Relevant Orders    Follow Up In Clinical Pharmacy             Diabetes:  Patient is a newly diagnosed Type 2 diabetic with most recent A1c of 6.9% on 7/18/24 (Goal < 7%). Since last visit, patient initiated Metformin; however, had to discontinue therapy d/t profuse diarrhea. Endo recommended 7 day medication free period, then pt to start Mounjaro. Patient reports stopping metformin approx one week ago and has not picked up Mounjaro. Discussed Mounjaro admin, MOA, side effects and warnings. Scheduled delivery of Mounjaro for 10/01/24 and patient plans to start medication same day. Close follow up 3 weeks for tolerability check in.  Start:  Mounjaro 2.5 injected subcutaneously once weekly (expected start date 10/01/24)    Follow up: 10/17/24 via phone      Sheba Nguyen, PharmD     Continue all meds under the continuation of care with the referring provider and clinical pharmacy team. Patient/Caregiver was informed they may decline to participate or withdraw from participation in pharmacy services at any time.

## 2024-09-25 NOTE — Clinical Note
Followed up with patient today after planned initiation of medications/ after side effects from metformin. Pt endorses stopping metformin as directed. Answered all questions regarding Mounjaro, pt will be receiving delivery on 10/01/24 and plans to start same day. Close follow up with pharmacy scheduled for 10/17/24 to ensure tolerating new regimen well. Pt instructed to follow up with you or clinical pharmacy if experiencing any issues.

## 2024-09-26 ENCOUNTER — TREATMENT (OUTPATIENT)
Dept: PHYSICAL THERAPY | Facility: CLINIC | Age: 64
End: 2024-09-26
Payer: COMMERCIAL

## 2024-09-26 DIAGNOSIS — R35.0 INCREASED FREQUENCY OF URINATION: ICD-10-CM

## 2024-09-26 PROCEDURE — 97110 THERAPEUTIC EXERCISES: CPT | Mod: GP | Performed by: PHYSICAL THERAPIST

## 2024-09-26 NOTE — PROGRESS NOTES
Physical Therapy    PELVIC FLOOR  TREATMENT    Name: Kavita Jung  MRN: [id number]  : 1960  Today's Date: 24     Time Calculation  Start Time: 1500  Stop Time: 1545  Time Calculation (min): 45 min       PT Therapeutic Procedures Time Entry  Therapeutic Exercise Time Entry: 38       Visit: 3  Insurance:  Employee  Auth: Yes  12 visits through 24    Assessment:  Pt did well with additional flexibility work. Some difficulty and cuing required to perform cat cow with correct coordination   Will continue to focus on pelvic floor mobility/flexibility       Plan:   Half kneeling flexibility  Seated hip IR  Hip hinge     Current Problem:    Problem List Items Addressed This Visit             ICD-10-CM    Increased frequency of urination R35.0         Subjective   Pt does feel like she is making small improvements   Was able to leave work Tuesday without voiding 2x prior to leaving (which would be her normal)          Objective     POSTURE:   Rounded shoulder posturing  Increased thoracic kyphosis  Reduced lumbar lordosis in standing  Mild tenderness to PSIS bilat in standing     Pelvic crest height symmetric        ROM R/L:  Lumbar flex: 70 deg; limited by hamstring length   Lumbar ext: 10 deg   Lumbar side bend: 20 deg / 20 deg   Lumbar rotation: 75% / 100%     Stretch with R rotation     Hip flex: WNL/WNL  Hip IR: mod limitation/moderate limitation  Hip ER: moderate limitation/moderate limitation     STRENGTH  R/L     Hip flexion: 5/5   Hip abd: 5/5   Hip add: 5/5   Hip ER: 4+/4+   Hip IR: 5/5      Quadriceps: 5/5   Hamstrings: 4+/4+      EXTERNAL OBSERVATION:  Voluntary Contraction: present   Voluntary Relaxation: incomplete  Involuntary Contraction: present  Involuntary Relaxation: incomplete         INTERNAL VAGINAL EXAMINATION:  PFM Strength: 2/5  Coordination: NT     INTERNAL PALPATION:   No pain, tenderness with palpation  Tension noted with internal palpation at levator ani group  bilat    EXTERNAL PALPATION:  Levator Ani: Mod Pain/Tightness R, Mod Pain/Tightness L  Bulbo: Mild Pain/Tightness R, Mild Pain/Tightness L  Ischiocavernosus: Mild Pain/Tightness R, Mild Pain/Tightness L  Transverse perineum: Mild Pain/Tightness R, Mild Pain/Tightness L      TREATMENT  Therapeutic exercise:  Butterfly stretch x 1 min   Happy baby stretch 1 min hold   Clamshells x 10  Reverse clamshells x 10   Figure 4 stretch x 1 min ea   Cat cow x 10   Child's pose x 10   Squat with PF relaxation x 10 with 5 hold       Careplan Goals:  Problem: PT Problem       Goal: Pt will be independent in urgency control techniques for improved bladder control and reduced frequency           Goal: Pt will reduce nocturia to 1-2x/night for improved sleep pattern          Goal: Pt will increase hip mobility bilat for improved hip and pelvic floor flexibility           Goal: Pt will increase bilateral LE strength by 1 MMT grade for all weakened muscle groups for improved pelvic and LE stability           Goal: Pt will be independent in HEP for home maintenance            Kaleb Hayes, PT

## 2024-09-30 ENCOUNTER — PHARMACY VISIT (OUTPATIENT)
Dept: PHARMACY | Facility: CLINIC | Age: 64
End: 2024-09-30
Payer: COMMERCIAL

## 2024-09-30 ENCOUNTER — APPOINTMENT (OUTPATIENT)
Dept: INTEGRATIVE MEDICINE | Facility: CLINIC | Age: 64
End: 2024-09-30
Payer: COMMERCIAL

## 2024-09-30 DIAGNOSIS — M26.609 TMJ (TEMPOROMANDIBULAR JOINT SYNDROME): Primary | ICD-10-CM

## 2024-09-30 DIAGNOSIS — R23.2 HOT FLASHES: ICD-10-CM

## 2024-09-30 PROCEDURE — 97810 ACUP 1/> WO ESTIM 1ST 15 MIN: CPT | Performed by: ACUPUNCTURIST

## 2024-09-30 PROCEDURE — 97811 ACUP 1/> W/O ESTIM EA ADD 15: CPT | Performed by: ACUPUNCTURIST

## 2024-10-02 ENCOUNTER — APPOINTMENT (OUTPATIENT)
Dept: INTEGRATIVE MEDICINE | Facility: CLINIC | Age: 64
End: 2024-10-02
Payer: COMMERCIAL

## 2024-10-07 ENCOUNTER — APPOINTMENT (OUTPATIENT)
Dept: PHYSICAL THERAPY | Facility: CLINIC | Age: 64
End: 2024-10-07
Payer: COMMERCIAL

## 2024-10-08 ENCOUNTER — APPOINTMENT (OUTPATIENT)
Dept: INTEGRATIVE MEDICINE | Facility: CLINIC | Age: 64
End: 2024-10-08
Payer: COMMERCIAL

## 2024-10-08 DIAGNOSIS — R23.2 HOT FLASHES: ICD-10-CM

## 2024-10-08 DIAGNOSIS — M26.609 TMJ (TEMPOROMANDIBULAR JOINT SYNDROME): Primary | ICD-10-CM

## 2024-10-08 PROCEDURE — 97811 ACUP 1/> W/O ESTIM EA ADD 15: CPT | Performed by: ACUPUNCTURIST

## 2024-10-08 PROCEDURE — 97810 ACUP 1/> WO ESTIM 1ST 15 MIN: CPT | Performed by: ACUPUNCTURIST

## 2024-10-08 NOTE — PROGRESS NOTES
Acupuncture Visit:     Subjective   Patient ID: Kavita Jung is a 64 y.o. female who presents for No chief complaint on file.   insurance  3    TMJ  Hot flashes    States less discomfort in jaw, less popping when opening,  States hot flashes less often.    prev  Less intense pain p acu.  Could not get an appt for 1 month  Hot flashes or night sweats- gets damp- daily with weather changes.  Loose stools  in morning,  most days, GB removed  Saw GI, she was told nothing can be done about the diarrhea.    Initial  TMJ   States she has had x 10 yrs  Notes she used to have ha from stress and grinding, used to wake up with ha  States she grinds, wears a , bottom teeth fitting.  Feels tension BL jaw when opens mouth.  Worse hard food and chewing for a long time.  Jaw tension not waking her up.  Notes 2 implants lower jaw.    Hot flashes  States she has been having hot flashes x 4 yrs during the day, 2-3 times, 1-2x/day   Night flashes more predominant that day flashes.  Notes she does wake up with urge to urinate then feels a hot sensation rising  Notes she has 4 children, last child is 20 yo.    Denies taking hormones.     Med hx includes essential tremor, jaw tremor,  cholecystectomy 10 + yrs due to gallstones, fallopian tubes tied after  with 4th child  HTN, recent diagnosis of DM II (was taking for 3 days d/t side effects)            Session Information  Is this acupuncture treatment being billed to the patient's insurance company: Yes  This is visit number: 3  The patient has a total number of visits of: 20  Initial Acupuncture Treatment date: 24  Name of Insurance Company:  insurance         Review of Systems         Provider reviewed plan for the acupuncture session, precautions and contraindications. Patient/guardian/hospital staff has given consent to treat with full understanding of what to expect during the session. Before acupuncture began, provider explained to the patient to  communicate at any time if the procedure was causing discomfort past their tolerance level. Patient agreed to advise acupuncturist. The acupuncturist counseled the patient on the risks of acupuncture treatment including pain, infection, bleeding, and no relief of pain. The patient was positioned comfortably. There was no evidence of infection at the site of needle insertions.    Objective   Physical Exam              Acupuncture Treatment  Body Points - Left: 88.12,  SP 6-4, 9 1.5, Hrt 5-6  Body Points - Bilateral: Kd 6,  77.22-23, ST 7, SI 19, Larisa 7  TDP Lamp Descripton: feet  Needle Count In: 19  Needle Count Out: 19  Total Face to Face Time (min): 25 minutes              Assessment/Plan

## 2024-10-10 ENCOUNTER — APPOINTMENT (OUTPATIENT)
Dept: INTEGRATIVE MEDICINE | Facility: CLINIC | Age: 64
End: 2024-10-10
Payer: COMMERCIAL

## 2024-10-10 DIAGNOSIS — M26.609 TMJ (TEMPOROMANDIBULAR JOINT SYNDROME): ICD-10-CM

## 2024-10-10 DIAGNOSIS — R23.2 HOT FLASHES: ICD-10-CM

## 2024-10-10 DIAGNOSIS — N95.1 HOT FLASHES DUE TO MENOPAUSE: Primary | ICD-10-CM

## 2024-10-10 DIAGNOSIS — Z71.89 ENCOUNTER FOR HERB AND VITAMIN SUPPLEMENT MANAGEMENT: ICD-10-CM

## 2024-10-10 PROCEDURE — 99212 OFFICE O/P EST SF 10 MIN: CPT | Performed by: NURSE PRACTITIONER

## 2024-10-10 NOTE — PROGRESS NOTES
Kavita follows up    PMH: Type 2 diabetes, TMJ, Menopausal    Started Monjaro for Diabetes, and has lost 7 pounds.     Successes:   -So has been in for a few apts for acupuncture and it is helping with hot flashes and TMJ      Challenges:   - Did not purchase supplements   -Was unable to complete food log.     Nutrition   We discussed monjaro and its potential effects to lose muscle mass, should be eating 140 grams of protein a day. She has protein handout  Has been eating greek yogurt/carol seeds, walnuts, and granola  Snacks edisno:   Talked about water intake and drinking 70

## 2024-10-15 ENCOUNTER — ALLIED HEALTH (OUTPATIENT)
Dept: INTEGRATIVE MEDICINE | Facility: CLINIC | Age: 64
End: 2024-10-15
Payer: COMMERCIAL

## 2024-10-15 ENCOUNTER — APPOINTMENT (OUTPATIENT)
Dept: NEUROLOGY | Facility: CLINIC | Age: 64
End: 2024-10-15
Payer: COMMERCIAL

## 2024-10-15 VITALS
RESPIRATION RATE: 16 BRPM | HEART RATE: 67 BPM | BODY MASS INDEX: 24.87 KG/M2 | SYSTOLIC BLOOD PRESSURE: 137 MMHG | WEIGHT: 136 LBS | DIASTOLIC BLOOD PRESSURE: 84 MMHG

## 2024-10-15 DIAGNOSIS — R61 NIGHT SWEATS: Primary | ICD-10-CM

## 2024-10-15 DIAGNOSIS — R41.3 MEMORY LOSS: ICD-10-CM

## 2024-10-15 DIAGNOSIS — R23.2 HOT FLASHES: ICD-10-CM

## 2024-10-15 DIAGNOSIS — G25.0 ESSENTIAL TREMOR: Primary | ICD-10-CM

## 2024-10-15 DIAGNOSIS — M26.609 TMJ (TEMPOROMANDIBULAR JOINT SYNDROME): ICD-10-CM

## 2024-10-15 PROCEDURE — 1036F TOBACCO NON-USER: CPT | Performed by: PSYCHIATRY & NEUROLOGY

## 2024-10-15 PROCEDURE — 3079F DIAST BP 80-89 MM HG: CPT | Performed by: PSYCHIATRY & NEUROLOGY

## 2024-10-15 PROCEDURE — 99214 OFFICE O/P EST MOD 30 MIN: CPT | Performed by: PSYCHIATRY & NEUROLOGY

## 2024-10-15 PROCEDURE — 97811 ACUP 1/> W/O ESTIM EA ADD 15: CPT | Performed by: ACUPUNCTURIST

## 2024-10-15 PROCEDURE — 97810 ACUP 1/> WO ESTIM 1ST 15 MIN: CPT | Performed by: ACUPUNCTURIST

## 2024-10-15 PROCEDURE — 3075F SYST BP GE 130 - 139MM HG: CPT | Performed by: PSYCHIATRY & NEUROLOGY

## 2024-10-15 ASSESSMENT — ENCOUNTER SYMPTOMS
LOSS OF SENSATION IN FEET: 0
OCCASIONAL FEELINGS OF UNSTEADINESS: 0
DEPRESSION: 0

## 2024-10-15 ASSESSMENT — FAHN TOLOSA MARTIN TREMOR (FTM)
VOICE TOTAL SCORE: 0
FACE AT POSTURE WHEN STANDING OR SITTING: 0
TRUNK  WHEN SITTING OR STANDING: 0
LE AT REST: 0
FACE TOTAL: 0
FACE IN REPOSE: 0
LE TOE TO FINGER IN A FLEXED POSTURE: 0
RLE TOTAL SCORE: 0
VOICE IN ACTION: 0
HEAD IN REPOSE: 0
LUE TOTAL SCORE: 3
HEAD AT POSTURE WHEN STANDING OR SITTING: 0
DRAWING A LEFT SCORE: 2
PART A SUBTOTAL SCORE: 5
UE ARM AT REST: 0
LE AT REST: 0
DRAWING A RIGHT SCORE: 1
UE ARM AT REST: 0
LE LEG FLEXED AT HIPS AND KNEES AT POSTURE: 0
TRUNK IN REPOSE: 0
UE FINGER TO NOSE AND OTHER ACTIONS: 1
DRAWING A TOTAL SCORE: 3
TOUNGE TOTAL SCORE: 1
HANDWRITING WITH DOMINANT HAND: 1
UE FINGER TO NOSE AND OTHER ACTIONS: 2
LE LEG FLEXED AT HIPS AND KNEES AT POSTURE: 0
LE TOE TO FINGER IN A FLEXED POSTURE: 0
TOUNGE WHEN PROTUDED: 1
UE ARMS OUTSTRECHED WRISTS MILDLY EXTENDED FINGERS SPREAD APART: 1
UE ARMS OUTSTRECHED WRISTS MILDLY EXTENDED FINGERS SPREAD APART: 0
HEAD TOTAL SCORE: 0
LLE TOTAL SCORE: 0
TRUNK TOTAL SCORE: 0
TOUNGE AT REST: 0
RUE TOTAL SCORE: 1

## 2024-10-15 ASSESSMENT — PATIENT HEALTH QUESTIONNAIRE - PHQ9
2. FEELING DOWN, DEPRESSED OR HOPELESS: NOT AT ALL
SUM OF ALL RESPONSES TO PHQ9 QUESTIONS 1 AND 2: 0
1. LITTLE INTEREST OR PLEASURE IN DOING THINGS: NOT AT ALL

## 2024-10-15 NOTE — PROGRESS NOTES
"Acupuncture Visit:     Subjective   Patient ID: Kavita Jung is a 64 y.o. female who presents for No chief complaint on file.   insurance  4    TMJ  Hot flashes    States she hears less \"clicking\" when opening jaw  Notes less night sweats , not many hot flashes.    prev  States less discomfort in jaw, less popping when opening,  States hot flashes less often.    prev  Less intense pain p acu.  Could not get an appt for 1 month  Hot flashes or night sweats- gets damp- daily with weather changes.  Loose stools  in morning,  most days, GB removed  Saw GI, she was told nothing can be done about the diarrhea.    Initial  TMJ   States she has had x 10 yrs  Notes she used to have ha from stress and grinding, used to wake up with ha  States she grinds, wears a , bottom teeth fitting.  Feels tension BL jaw when opens mouth.  Worse hard food and chewing for a long time.  Jaw tension not waking her up.  Notes 2 implants lower jaw.    Hot flashes  States she has been having hot flashes x 4 yrs during the day, 2-3 times, 1-2x/day   Night flashes more predominant that day flashes.  Notes she does wake up with urge to urinate then feels a hot sensation rising  Notes she has 4 children, last child is 22 yo.    Denies taking hormones.     Med hx includes essential tremor, jaw tremor,  cholecystectomy 10 + yrs due to gallstones, fallopian tubes tied after  with 4th child  HTN, recent diagnosis of DM II (was taking for 3 days d/t side effects)            Session Information  Is this acupuncture treatment being billed to the patient's insurance company: Yes  This is visit number: 4  The patient has a total number of visits of: 20  Initial Acupuncture Treatment date: 24  Name of Insurance Company:  insurance         Review of Systems         Provider reviewed plan for the acupuncture session, precautions and contraindications. Patient/guardian/hospital staff has given consent to treat with full " understanding of what to expect during the session. Before acupuncture began, provider explained to the patient to communicate at any time if the procedure was causing discomfort past their tolerance level. Patient agreed to advise acupuncturist. The acupuncturist counseled the patient on the risks of acupuncture treatment including pain, infection, bleeding, and no relief of pain. The patient was positioned comfortably. There was no evidence of infection at the site of needle insertions.    Objective   Physical Exam              Acupuncture Treatment  Body Points - Left: 88.12,  SP 6-4, 9 1.5  Body Points - Bilateral: Kd 6,  77.22-23, ST 7, SI 19, Larisa 7, Hrt 5-6  Other Techniques Utilized: TDP Lamp  TDP Lamp Descripton: feet  Needle Count In: 24  Needle Count Out: 24  Total Face to Face Time (min): 25 minutes              Assessment/Plan

## 2024-10-15 NOTE — PATIENT INSTRUCTIONS
"It was a pleasure seeing you today for essential tremor.     --CalaTrio  --Continue current medications    Please make a follow up appointment at the  or by calling the office in 6 months.     For any urgent issues or questions call [phone], option for doctor's , during our office hours Monday-Friday 8am-4:30pm, and leave a voicemail with your concern.  My office will try to reach back you as soon as possible within 24 (business) hours.  If you have an emergency please call 911 or visit a local urgent care or nearest emergency room.      Please understand that KAYAK is a useful communication tool for simple \"normal\" results or a refill request but I would not recommend using this tool for emergent or urgent issues.  I am happy to ask my staff to arrange a follow up visit or a virtual visit sooner than requested with myself or Jordan (Nurse Practitioner), if appropriate for your care.    "

## 2024-10-15 NOTE — PROGRESS NOTES
"Subjective     Kavita Jung is a right handed  64 y.o. year old female who presents with tremor.  Visit type: follow up visit     HPI    Patient Health Questionnaire-2 Score: 0         Ms. Jung is a 64 y.o.  RH woman with ET.  Tremor is unchanged.  Tremor interferes with sewing and cooking.  Trouble measuring things in the kitchen.  She does not think she needs more treatment necessarily, but had AE's when she previously tried to raise primidone (fatigue).    STM is unchanged from 2019 when she had no neurodegenerative pattern on neuropsych with only mild deficits.    Tremor meds:  Propranolol 40mg 1 BID (higher dose caused headache)  Gabapentin caused headache so she stopped  Primidone 50mg 3 tabs at bedtime (she felt \"out of body\" as she woke up)    Prior meds:    She reports a history of kidney stones so cannot take topamax  On higher doses of primidone (up to 300mg qhs) she developed an out of body experience  She has never tried gabapentin      PMHx: No changes    SHx: Lives with , works for [employer], usually tremors do not affect her work    FHx:   - Brother has PD  - Mom had tremors of the head     Review of Systems  Detailed review of systems is documented in the scanned patient questionnaire and was reviewed with the patient.     Objective   Neurological Exam    Vitals:    10/15/24 0842   BP: 137/84   BP Location: Right arm   Patient Position: Sitting   BP Cuff Size: Adult   Pulse: 67   Resp: 16   Weight: 61.7 kg (136 lb)       Visit Vitals  /84 (BP Location: Right arm, Patient Position: Sitting, BP Cuff Size: Adult)   Pulse 67   Resp 16   Wt 61.7 kg (136 lb)   BMI 24.87 kg/m²   OB Status Menopausal   Smoking Status Never   BSA 1.64 m²        Well-appearing woman in NAD  AAOx3  bl kinetic tremor LH>RH  Spirals are c/w sinusoidal tremor L>R        Physical Exam      [facility] Visit from 10/15/2024 [facility] with Silvestre Mendieta MD     10/15/2024    0849 "   PART A     Face at rest 0   Face at posture 0   Face Total 0   Tongue at rest 0   Tongue at posture 1   Tongue total 1   Voice in action 0   Voice total 0   Head at rest 0   Head at posture 0   Head total 0   RUE at rest 0   RUE at posture 0   RUE in action 1   RUE total 1   LUE at rest 0   LUE at posture 1   LUE in action 2   LUE total 3   Trunk at rest 0   Trunk at posture 0   Trunk total 0   RLE at rest 0   RLE at posture 0   RLE in action 0   RLE total 0   LLE at rest 0   LLE at posture 0   LLE in action 0   LLE total 0   Part A subtotal 5   PART B     Handwriting dominant 1   Drawing A - Right 1   Drawing A - Left 2   Drawing A total 3   Drawing B - Right --   Drawing B - Left --   Drawing B total --   Drawing C - Right --   Drawing C - Left --   Pouring --   Drawing C total --   Part B subtotal --   PART C     Speaking --   Feeding --   Bringing liquids to mouth --   Hygiene --   Dressing --   Writing --   Working --   Part C subtotal --   Score           Assessment/Plan   Ms. Jung is a 61 YO RH woman with ET on maximum tolerated primidone.  She may be out of treatment options with pills, and she does not feel she wants to raise medication anyway for the mild tremor.  We discussed CalaTrio as a potential treatment option.  I measured her wrist and it is 15.1cm.  She has failed multiple medications and tremor is disabling and interfering with daily activities.  No pacemaker or contraindication to CalaTrio.  Use of the device on the left wrist may dampen the L hand tremors.  She will let us know if she decides to pursue it.  Memory is stable chronically.  She does not have any signs of parkinsonism now.  Follow up in 6 months.    CODING and DOCUMENTATION DETERMINATION  For the Evaluation and Management of this patient, the level of Medical Decision Making for this visit was determined based on the following:  The level of COMPLEXITY AND NUMBER OF PROBLEMS ADDRESSED was [HIGH, MODERATE, LOW] as determined  by:   MODERATE:  two or more stable chronic illnesses - tremor and memory loss  The level of RISK OF COMPLICATIONS was MODERATE as determined by: MODERATE: prescription drug management.  Thus, the level of medical decision making (based on the lower of the two highest elements) was determined to be MODERATE

## 2024-10-17 ENCOUNTER — TREATMENT (OUTPATIENT)
Dept: PHYSICAL THERAPY | Facility: CLINIC | Age: 64
End: 2024-10-17
Payer: COMMERCIAL

## 2024-10-17 ENCOUNTER — APPOINTMENT (OUTPATIENT)
Dept: PHARMACY | Facility: HOSPITAL | Age: 64
End: 2024-10-17
Payer: COMMERCIAL

## 2024-10-17 DIAGNOSIS — R35.0 INCREASED FREQUENCY OF URINATION: ICD-10-CM

## 2024-10-17 DIAGNOSIS — E11.65 TYPE 2 DIABETES MELLITUS WITH HYPERGLYCEMIA, WITHOUT LONG-TERM CURRENT USE OF INSULIN: Primary | ICD-10-CM

## 2024-10-17 PROCEDURE — 97110 THERAPEUTIC EXERCISES: CPT | Mod: GP | Performed by: PHYSICAL THERAPIST

## 2024-10-17 NOTE — Clinical Note
Pt tolerating Mounjaro very well. Only experiencing some mild nausea, BGs overall expected to be within goal range, appetite diminished but still eating regular meals. Reports GI upset/diarrhea experienced with metformin has completely resolved. Plan to stay at Mounjaro 2.5 mg for now until repeat labs. Labs to be repeated prior to your endo follow up in January. A1c, CMP, lipid panel, DLDL, UACR ordered, expected at least one week prior to your follow up.

## 2024-10-17 NOTE — PATIENT INSTRUCTIONS
Today we discussed your diabetes medications.   Changes made at today's visit: None  Continue:  Mounjaro 2.5 mg injected under the skin once weekly  All other medications as prescribed    Follow Up: 12/17/24 at 9:30 AM via phone for check in    Coming Up:  Obtain lab work approx. 1 week prior to endocrinology appt in January. Labs expected anytime between 1/6/25-1/13/25.  Endocrinology appt: 1/20/24    Sheba Nguyen, PharmD  P: [phone]    To schedule an appointment, please contact:  P: [phone]        Review of Employee Health Plan:  You are enrolled in the [facility] Employee Value Based Insurance Design (VBID) program. This program allows you to receive for $0 co-pays on diabetes medications/supplies.  To maintain your eligibility for this program, you must:  Maintain  employee insurance coverage  Fill prescriptions at a  pharmacy for pick-up or home delivery  Complete a visit with a clinical pharmacist at least once annually    Thank you for entrusting your care to the Clinical Pharmacy Team! We look forward to seeing you again soon.  Please call your clinical pharmacist with any questions or concerns.

## 2024-10-17 NOTE — PROGRESS NOTES
[facility] ([place])    Kavita Jung is a 64 y.o. female referred to Clinical Pharmacy Team as part of the Value Based Insurance Design (VBID) diabetes program. Pharmacy team may also provide assistance in diabetes management per discussion with referring provider and/or endocrinology. Referring Provider: Devora Hernandez APR*    Does patient follow with Endocrinology: Yes  Endocrinology Provider Name: ZENON Hernandez    Subjective   Allergies   Allergen Reactions    Metformin Diarrhea    Glimepiride Hives and Rash     Pt reports severe topical rash/hives with glimepiride after 4 days of use, unable to tolerate   Reviewed by Sheba Nguyen, PharmD 10/17/24    Diabetes  She presents for her follow-up diabetic visit. She has type 2 diabetes mellitus. The initial diagnosis of diabetes was made 3 months (new diagnosis, hx of gestational diabetes) ago. There are no hypoglycemic associated symptoms. There are no hypoglycemic complications. Risk factors for coronary artery disease include diabetes mellitus, hypertension, post-menopausal and dyslipidemia. An ACE inhibitor/angiotensin II receptor blocker is not being taken. Eye exam is current.     Pertinent PMH Review:  PMH of Pancreatitis: No  PMH of Retinopathy: No - last eye exam, Jan 2024  PMH of Urinary Tract Infections: No  PMH of MTC: No    HOME MONITORING  Monitoring Device: OneTouch Verio Flex meter, manual glucometer  Monitoring Frequency: first thing in the morning, about 1.5 hours after big meal    Current home BG readings: AM FBGs ~130 mg/dL, never below 100 mg/dL. Overall range 100-176 mg/dL  10/17 - 136 mg/dL   Highest witnessed: 176 mg/dL 1.5 hours PPG  Previous home BG readings: not monitoring  Any episodes of hypoglycemia? No, denies .      ADHERENCE  Affordability/Accessibility: no concerns, enrolled in [employer] $0 copay program, has refills  Adherence: no concerns, no doses missed  Side effects: some mild nausea but  "denies abdominal pain, GI upset, or other concerns. Reports appetite lower, but still eating consistent meals.      Objective     LAB  Lab Results   Component Value Date    BILITOT 0.3 01/23/2024    CALCIUM 9.2 07/18/2024    CO2 28 07/18/2024     07/18/2024    CREATININE 0.72 07/18/2024    GLUCOSE 277 (H) 07/18/2024    ALKPHOS 91 01/23/2024    K 4.4 07/18/2024    PROT 7.6 01/23/2024     07/18/2024    AST 19 01/23/2024    ALT 19 07/18/2024    BUN 11 07/18/2024    ANIONGAP 12 07/18/2024    PHOS 3.2 11/04/2017    ALBUMIN 4.2 01/23/2024    LIPASE 44 01/23/2024    GFRF 83 10/20/2022     Lab Results   Component Value Date    TRIG 401 (H) 07/18/2024    CHOL 178 07/18/2024    LDLCALC  07/18/2024      Comment:      The calculation of LDL and VLDL are inaccurate when the Triglycerides are greater than 400 mg/dL or when the patient is non-fasting. If LDL measurement is necessary contact the testing laboratory for an alternative LDL assay.                                  Near   Borderline      AGE      Desirable  Optimal    High     High     Very High     0-19 Y     0 - 109     ---    110-129   >/= 130     ----    20-24 Y     0 - 119     ---    120-159   >/= 160     ----      >24 Y     0 -  99   100-129  130-159   160-189     >/=190      HDL 41.7 07/18/2024     Lab Results   Component Value Date    HGBA1C 6.9 (H) 07/18/2024     Estimated body mass index is 24.87 kg/m² as calculated from the following:    Height as of 8/22/24: 1.575 m (5' 2\").    Weight as of 10/15/24: 61.7 kg (136 lb).     Current Outpatient Medications on File Prior to Visit   Medication Sig Dispense Refill    tirzepatide (Mounjaro) 2.5 mg/0.5 mL pen injector Inject 2.5 mg under the skin every 7 days. Do not fill before September 10, 2024. 2 mL 11    alcohol swabs pads, medicated Use one swab to cleanse skin and allow to dry prior to injecting medications or testing blood glucose. 200 each 11    blood sugar diagnostic (OneTouch Verio test strips) " "strip Check once in the morning before breakfast then 1-2 hours after largest meal of the day 100 strip 3    blood-glucose meter (OneTouch Verio Flex meter) misc Use to check glucose as directed. 1 each 0    clotrimazole-betamethasone (Lotrisone) cream APPLY THIN COAT TO AFFECTED AREA TWICE A DAY IN THE MORNING AND IN THE EVENING      cyanocobalamin (Vitamin B-12) 1,000 mcg/mL injection Inject 1 mL (1,000 mcg) into the muscle every 30 (thirty) days. 1 mL 11    ferrous sulfate (IRON ORAL) Take 1 tablet by mouth once daily. With food      insulin syringe-needle U-100 1 mL 30 gauge x 1/2\" syringe Use to inject 1 time IM, once a week as directed for Vit B12 injection 100 each 11    lancets (OneTouch Delica Plus Lancet) 33 gauge misc Use new lancet each time with lancing device to prick finger and test blood glucose up to four times daily as needed. 100 each 11    multivitamin (MULTIPLE VITAMINS ORAL) Take 1 tablet by mouth once daily.      primidone (Mysoline) 50 mg tablet Take 3 tablets (150 mg) by mouth once daily at bedtime. 270 tablet 2    propranolol (Inderal) 40 mg tablet Take 1 tablet (40 mg) by mouth 2 times a day. 180 tablet 2     No current facility-administered medications on file prior to visit.      MEDICATION RECONCILIATION  Added: none  Changed: none  Removed: none    Drug Interactions:  None warranting change in therapy at this time    CURRENT DIABETES PHARMACOTHERAPY  Mounjaro 2.5 mg injected subcutaneously once weekly - initiated 10/1/24, three doses administered so far    HISTORICAL PHARMACOTHERAPY (if relevant)  Glimepiride - unable to tolerate, pt reports severe rash  Metformin 500 mg XR - take one tablet (500 mg) PO daily with food     SECONDARY PREVENTION  Statin? no  LDL: unable to assess d/t high TGs - no TG therapy started yet  ACE-I/ARB? no  BP: not at goal, < 130/80  UACR:  unable to assess - labs ordered  Aspirin?  no    VBID Employee Diabetes Program Enrollment: Enrollment up to " date  Patient enrolled in  Employee diabetes program for $0 co-pays on diabetes medications/supplies.  Requested VBID enrollment date: 8/28/24  PharmD Management Level: Level 3-4   Pharmacy fill location: [facility] - Home delivery    Assessment/Plan   Problem List Items Addressed This Visit    None  Visit Diagnoses       Type 2 diabetes mellitus with hyperglycemia, without long-term current use of insulin    -  Primary          Diabetes:  Patient's Type 2 Diabetes needs improvement with most recent A1c of 6.9% on 7/18/24 (Goal < 7%). Overall doing very well on newly initiated therapy, Mounjaro, reporting mild nausea but no other concerning symptoms. Reports diarrhea associated with metformin has resolved and no other GI concerns. Reports appetite decreased, but still able to eat consistent meals. Patient is monitoring BG BID, with overall range of 100-176 mg/dL and expected to be at goal. As no need to target weight loss, plan to continue Mounjaro 2.5 mg until repeat labs obtained: A1c, CMP, lipid panel, DLDL, UACR ordered, expected January 2025.  Continue:  Mounjaro 2.5 injected subcutaneously once weekly    Follow up: 12/17/24 via phone for tolerability check in, BG review      Sheba Nguyen, PharmD     Continue all meds under the continuation of care with the referring provider and clinical pharmacy team. Patient/Caregiver was informed they may decline to participate or withdraw from participation in pharmacy services at any time.

## 2024-10-17 NOTE — PROGRESS NOTES
Physical Therapy    PELVIC FLOOR  TREATMENT    Name: Kavita Jung  MRN: [id number]  : 1960  Today's Date: 10/17/24     Time Calculation  Start Time: 1605  Stop Time: 1640  Time Calculation (min): 35 min       PT Therapeutic Procedures Time Entry  Therapeutic Exercise Time Entry: 30       Visit: 5  Insurance:  Employee  Auth: Yes  12 visits through 24    Assessment:  Continue to monitor HEP  If continued improvement, will spread out sessions over the next 2 months       Plan:   Hip hinge       Current Problem:    Problem List Items Addressed This Visit             ICD-10-CM    Increased frequency of urination R35.0         Subjective   Pt reports urgency is decreasing and she feels like she is voiding less       Objective     POSTURE:   Rounded shoulder posturing  Increased thoracic kyphosis  Reduced lumbar lordosis in standing  Mild tenderness to PSIS bilat in standing     Pelvic crest height symmetric        ROM R/L:  Lumbar flex: 70 deg; limited by hamstring length   Lumbar ext: 10 deg   Lumbar side bend: 20 deg / 20 deg   Lumbar rotation: 75% / 100%     Stretch with R rotation     Hip flex: WNL/WNL  Hip IR: mod limitation/moderate limitation  Hip ER: moderate limitation/moderate limitation     STRENGTH  R/L     Hip flexion: 5/5   Hip abd: 5/5   Hip add: 5/5   Hip ER: 4+/4+   Hip IR: 5/5      Quadriceps: 5/5   Hamstrings: 4+/4+        TREATMENT  Therapeutic exercise:  Butterfly stretch x 1 min   Figure 4 stretch x 1 min ea   Happy baby stretch 1 min hold   Clamshells x 10   Reverse clamshells x 10   Seated hip IR (yoga block) x 10   Cat cow x 10   Child's pose x 10   Squat with PF relaxation x 10 with 5 hold   Reverse lunge on slider x 10 ea       Careplan Goals:  Problem: PT Problem       Goal: Pt will be independent in urgency control techniques for improved bladder control and reduced frequency           Goal: Pt will reduce nocturia to 1-2x/night for improved sleep pattern          Goal: Pt  will increase hip mobility bilat for improved hip and pelvic floor flexibility           Goal: Pt will increase bilateral LE strength by 1 MMT grade for all weakened muscle groups for improved pelvic and LE stability           Goal: Pt will be independent in [facility] for home maintenance            Kaleb Hayes, PT

## 2024-10-21 ENCOUNTER — APPOINTMENT (OUTPATIENT)
Dept: DERMATOLOGY | Facility: CLINIC | Age: 64
End: 2024-10-21
Payer: COMMERCIAL

## 2024-10-21 DIAGNOSIS — L57.0 ACTINIC KERATOSIS: ICD-10-CM

## 2024-10-21 DIAGNOSIS — L81.4 LENTIGO: ICD-10-CM

## 2024-10-21 DIAGNOSIS — L30.9 DERMATITIS: ICD-10-CM

## 2024-10-21 DIAGNOSIS — L73.9 FOLLICULITIS: Primary | ICD-10-CM

## 2024-10-21 PROCEDURE — 99204 OFFICE O/P NEW MOD 45 MIN: CPT | Performed by: STUDENT IN AN ORGANIZED HEALTH CARE EDUCATION/TRAINING PROGRAM

## 2024-10-21 PROCEDURE — 17000 DESTRUCT PREMALG LESION: CPT | Performed by: STUDENT IN AN ORGANIZED HEALTH CARE EDUCATION/TRAINING PROGRAM

## 2024-10-21 RX ORDER — FLUOCINONIDE 0.5 MG/G
OINTMENT TOPICAL
Qty: 60 G | Refills: 1 | Status: SHIPPED | OUTPATIENT
Start: 2024-10-21

## 2024-10-21 ASSESSMENT — PATIENT GLOBAL ASSESSMENT (PGA): PATIENT GLOBAL ASSESSMENT: PATIENT GLOBAL ASSESSMENT:  1 - CLEAR

## 2024-10-21 ASSESSMENT — DERMATOLOGY QUALITY OF LIFE (QOL) ASSESSMENT
RATE HOW BOTHERED YOU ARE BY SYMPTOMS OF YOUR SKIN PROBLEM (EG, ITCHING, STINGING BURNING, HURTING OR SKIN IRRITATION): 0 - NEVER BOTHERED
DATE THE QUALITY-OF-LIFE ASSESSMENT WAS COMPLETED: 67134
RATE HOW BOTHERED YOU ARE BY EFFECTS OF YOUR SKIN PROBLEMS ON YOUR ACTIVITIES (EG, GOING OUT, ACCOMPLISHING WHAT YOU WANT, WORK ACTIVITIES OR YOUR RELATIONSHIPS WITH OTHERS): 0 - NEVER BOTHERED
ARE THERE EXCLUSIONS OR EXCEPTIONS FOR THE QUALITY OF LIFE ASSESSMENT: NO
RATE HOW EMOTIONALLY BOTHERED YOU ARE BY YOUR SKIN PROBLEM (FOR EXAMPLE, WORRY, EMBARRASSMENT, FRUSTRATION): 0 - NEVER BOTHERED

## 2024-10-21 ASSESSMENT — DERMATOLOGY PATIENT ASSESSMENT: DO YOU HAVE ANY NEW OR CHANGING LESIONS: YES

## 2024-10-21 ASSESSMENT — ITCH NUMERIC RATING SCALE: HOW SEVERE IS YOUR ITCHING?: 0

## 2024-10-22 ENCOUNTER — ALLIED HEALTH (OUTPATIENT)
Dept: INTEGRATIVE MEDICINE | Facility: CLINIC | Age: 64
End: 2024-10-22
Payer: COMMERCIAL

## 2024-10-22 ENCOUNTER — APPOINTMENT (OUTPATIENT)
Dept: DERMATOLOGY | Facility: CLINIC | Age: 64
End: 2024-10-22
Payer: COMMERCIAL

## 2024-10-22 DIAGNOSIS — M26.609 TMJ (TEMPOROMANDIBULAR JOINT SYNDROME): ICD-10-CM

## 2024-10-22 DIAGNOSIS — R23.2 HOT FLASHES: Primary | ICD-10-CM

## 2024-10-22 NOTE — PROGRESS NOTES
"Acupuncture Visit:     Subjective   Patient ID: Kavita Jung is a 64 y.o. female who presents for No chief complaint on file.   insurance  5  TMJ  Hot flashes    States hot flashes less frequent during the day  Notes continues to hear less clicking and also feels clicking less     prev  States she hears less \"clicking\" when opening jaw  Notes less night sweats , not many hot flashes.    prev  States less discomfort in jaw, less popping when opening,  States hot flashes less often.    prev  Less intense pain p acu.  Could not get an appt for 1 month  Hot flashes or night sweats- gets damp- daily with weather changes.  Loose stools  in morning,  most days, GB removed  Saw GI, she was told nothing can be done about the diarrhea.    Initial  TMJ   States she has had x 10 yrs  Notes she used to have ha from stress and grinding, used to wake up with ha  States she grinds, wears a , bottom teeth fitting.  Feels tension BL jaw when opens mouth.  Worse hard food and chewing for a long time.  Jaw tension not waking her up.  Notes 2 implants lower jaw.    Hot flashes  States she has been having hot flashes x 4 yrs during the day, 2-3 times, 1-2x/day   Night flashes more predominant that day flashes.  Notes she does wake up with urge to urinate then feels a hot sensation rising  Notes she has 4 children, last child is 22 yo.    Denies taking hormones.     Med hx includes essential tremor, jaw tremor,  cholecystectomy 10 + yrs due to gallstones, fallopian tubes tied after  with 4th child  HTN, recent diagnosis of DM II (was taking for 3 days d/t side effects)          Session Information  This is visit number: 5  The patient has a total number of visits of: 20  Initial Acupuncture Treatment date: 24  Name of Insurance Company:  insurance         Review of Systems         Provider reviewed plan for the acupuncture session, precautions and contraindications. Patient/guardian/hospital staff has " given consent to treat with full understanding of what to expect during the session. Before acupuncture began, provider explained to the patient to communicate at any time if the procedure was causing discomfort past their tolerance level. Patient agreed to advise acupuncturist. The acupuncturist counseled the patient on the risks of acupuncture treatment including pain, infection, bleeding, and no relief of pain. The patient was positioned comfortably. There was no evidence of infection at the site of needle insertions.    Objective   Physical Exam                             Assessment/Plan

## 2024-10-24 PROCEDURE — RXMED WILLOW AMBULATORY MEDICATION CHARGE

## 2024-10-25 ENCOUNTER — PHARMACY VISIT (OUTPATIENT)
Dept: PHARMACY | Facility: CLINIC | Age: 64
End: 2024-10-25
Payer: COMMERCIAL

## 2024-10-26 ENCOUNTER — PHARMACY VISIT (OUTPATIENT)
Dept: PHARMACY | Facility: CLINIC | Age: 64
End: 2024-10-26

## 2024-10-29 ENCOUNTER — APPOINTMENT (OUTPATIENT)
Dept: INTEGRATIVE MEDICINE | Facility: CLINIC | Age: 64
End: 2024-10-29
Payer: COMMERCIAL

## 2024-10-29 DIAGNOSIS — M26.609 TMJ (TEMPOROMANDIBULAR JOINT SYNDROME): Primary | ICD-10-CM

## 2024-10-29 DIAGNOSIS — R23.2 HOT FLASHES: ICD-10-CM

## 2024-10-29 PROCEDURE — 97810 ACUP 1/> WO ESTIM 1ST 15 MIN: CPT | Performed by: ACUPUNCTURIST

## 2024-10-29 PROCEDURE — 97811 ACUP 1/> W/O ESTIM EA ADD 15: CPT | Performed by: ACUPUNCTURIST

## 2024-11-05 ENCOUNTER — APPOINTMENT (OUTPATIENT)
Dept: INTEGRATIVE MEDICINE | Facility: CLINIC | Age: 64
End: 2024-11-05
Payer: COMMERCIAL

## 2024-11-07 ENCOUNTER — APPOINTMENT (OUTPATIENT)
Dept: PHYSICAL THERAPY | Facility: CLINIC | Age: 64
End: 2024-11-07
Payer: COMMERCIAL

## 2024-11-11 ENCOUNTER — APPOINTMENT (OUTPATIENT)
Dept: INTEGRATIVE MEDICINE | Facility: CLINIC | Age: 64
End: 2024-11-11
Payer: COMMERCIAL

## 2024-11-26 ENCOUNTER — APPOINTMENT (OUTPATIENT)
Dept: INTEGRATIVE MEDICINE | Facility: CLINIC | Age: 64
End: 2024-11-26
Payer: COMMERCIAL

## 2024-11-26 PROCEDURE — RXMED WILLOW AMBULATORY MEDICATION CHARGE

## 2024-11-27 ENCOUNTER — PHARMACY VISIT (OUTPATIENT)
Dept: PHARMACY | Facility: CLINIC | Age: 64
End: 2024-11-27
Payer: COMMERCIAL

## 2024-12-03 ENCOUNTER — APPOINTMENT (OUTPATIENT)
Dept: INTEGRATIVE MEDICINE | Facility: CLINIC | Age: 64
End: 2024-12-03
Payer: COMMERCIAL

## 2024-12-05 ENCOUNTER — APPOINTMENT (OUTPATIENT)
Dept: PHYSICAL THERAPY | Facility: CLINIC | Age: 64
End: 2024-12-05
Payer: COMMERCIAL

## 2024-12-10 ENCOUNTER — APPOINTMENT (OUTPATIENT)
Dept: INTEGRATIVE MEDICINE | Facility: CLINIC | Age: 64
End: 2024-12-10
Payer: COMMERCIAL

## 2024-12-16 DIAGNOSIS — G25.0 ESSENTIAL TREMOR: ICD-10-CM

## 2024-12-16 DIAGNOSIS — I10 BENIGN ESSENTIAL HTN: ICD-10-CM

## 2024-12-16 RX ORDER — PROPRANOLOL HYDROCHLORIDE 40 MG/1
40 TABLET ORAL 2 TIMES DAILY
Qty: 180 TABLET | Refills: 2 | Status: SHIPPED | OUTPATIENT
Start: 2024-12-16

## 2024-12-17 ENCOUNTER — APPOINTMENT (OUTPATIENT)
Dept: PHARMACY | Facility: HOSPITAL | Age: 64
End: 2024-12-17
Payer: COMMERCIAL

## 2024-12-17 DIAGNOSIS — E11.65 TYPE 2 DIABETES MELLITUS WITH HYPERGLYCEMIA, WITHOUT LONG-TERM CURRENT USE OF INSULIN: Primary | ICD-10-CM

## 2024-12-17 NOTE — PROGRESS NOTES
[facility] ([place])    Kavita Jung is a 64 y.o. female referred to Clinical Pharmacy Team as part of the [employer] ([employer]) diabetes program. Pharmacy team may also provide assistance in diabetes management per discussion with referring provider and/or endocrinology. Referring Provider: Devora Hernandez APR*    Does patient follow with Endocrinology: Yes  Endocrinology Provider Name: ZENON Hernandez    Subjective   Allergies   Allergen Reactions    Metformin Diarrhea    Glimepiride Hives and Rash     Pt reports severe topical rash/hives with glimepiride after 4 days of use, unable to tolerate   Reviewed by Sheba Nguyen, PharmD 12/17/24    Diabetes  She presents for her follow-up diabetic visit. She has type 2 diabetes mellitus. The initial diagnosis of diabetes was made 3 months (new diagnosis, hx of gestational diabetes) ago. There are no hypoglycemic associated symptoms. There are no hypoglycemic complications. Risk factors for coronary artery disease include diabetes mellitus, hypertension, post-menopausal and dyslipidemia. An ACE inhibitor/angiotensin II receptor blocker is not being taken. Eye exam is current.     Pertinent PMH Review:  PMH of Pancreatitis: No  PMH of Retinopathy: No - last eye exam, Jan 2024  PMH of Urinary Tract Infections: No  PMH of MTC: No    HOME MONITORING  Blood Glucose  Monitoring Device: OneTouch Verio Flex meter, manual glucometer  Monitoring Frequency: first thing in the morning, about 1.5 hours after big meal    Current home BG readings:   AM -119, 109, 131  PM - 104    PM - 219 mg/dL, highest seen, about 2 hours after meal, ate at restaurant (higher carb meal)    Previous BGs   AM FBGs ~130 mg/dL, never below 100 mg/dL. Overall range 100-176 mg/dL  10/17 - 136 mg/dL   Highest witnessed: 176 mg/dL 1.5 hours PPG  Previous home BG readings: not monitoring  Any episodes of hypoglycemia? No, denies .      Weight:  Reports  "starting weight: 140 lbs  Current weight (12/17/24): 129    ADHERENCE  Affordability/Accessibility: no concerns, enrolled in [employer] $0 copay program, has refills  Adherence: no concerns, no doses missed  Side effects: some mild nausea but denies abdominal pain, GI upset, or other concerns. Reports appetite lower, but still eating consistent meals.      Objective     LAB  Lab Results   Component Value Date    BILITOT 0.3 01/23/2024    CALCIUM 9.2 07/18/2024    CO2 28 07/18/2024     07/18/2024    CREATININE 0.72 07/18/2024    GLUCOSE 277 (H) 07/18/2024    ALKPHOS 91 01/23/2024    K 4.4 07/18/2024    PROT 7.6 01/23/2024     07/18/2024    AST 19 01/23/2024    ALT 19 07/18/2024    BUN 11 07/18/2024    ANIONGAP 12 07/18/2024    PHOS 3.2 11/04/2017    ALBUMIN 4.2 01/23/2024    LIPASE 44 01/23/2024    GFRF 83 10/20/2022     Lab Results   Component Value Date    TRIG 401 (H) 07/18/2024    CHOL 178 07/18/2024    LDLCALC  07/18/2024      Comment:      The calculation of LDL and VLDL are inaccurate when the Triglycerides are greater than 400 mg/dL or when the patient is non-fasting. If LDL measurement is necessary contact the testing laboratory for an alternative LDL assay.                                  Near   Borderline      AGE      Desirable  Optimal    High     High     Very High     0-19 Y     0 - 109     ---    110-129   >/= 130     ----    20-24 Y     0 - 119     ---    120-159   >/= 160     ----      >24 Y     0 -  99   100-129  130-159   160-189     >/=190      HDL 41.7 07/18/2024     Lab Results   Component Value Date    HGBA1C 6.9 (H) 07/18/2024     Estimated body mass index is 24.87 kg/m² as calculated from the following:    Height as of 8/22/24: 1.575 m (5' 2\").    Weight as of 10/15/24: 61.7 kg (136 lb).     Current Outpatient Medications on File Prior to Visit   Medication Sig Dispense Refill    alcohol swabs pads, medicated Use one swab to cleanse skin and allow to dry prior to injecting medications " "or testing blood glucose. 200 each 11    blood sugar diagnostic (OneTouch Verio test strips) strip Check once in the morning before breakfast then 1-2 hours after largest meal of the day 100 strip 3    blood-glucose meter (OneTouch Verio Flex meter) misc Use to check glucose as directed. 1 each 0    clotrimazole-betamethasone (Lotrisone) cream APPLY THIN COAT TO AFFECTED AREA TWICE A DAY IN THE MORNING AND IN THE EVENING      cyanocobalamin (Vitamin B-12) 1,000 mcg/mL injection Inject 1 mL (1,000 mcg) into the muscle every 30 (thirty) days. 1 mL 11    ferrous sulfate (IRON ORAL) Take 1 tablet by mouth once daily. With food      fluocinonide (Lidex) 0.05 % ointment Apply thin layer twice weekly if needed 60 g 1    insulin syringe-needle U-100 1 mL 30 gauge x 1/2\" syringe Use to inject 1 time IM, once a week as directed for Vit B12 injection 100 each 11    lancets (OneTouch Delica Plus Lancet) 33 gauge misc Use new lancet each time with lancing device to prick finger and test blood glucose up to four times daily as needed. 100 each 11    multivitamin (MULTIPLE VITAMINS ORAL) Take 1 tablet by mouth once daily.      primidone (Mysoline) 50 mg tablet Take 3 tablets (150 mg) by mouth once daily at bedtime. 270 tablet 2    propranolol (Inderal) 40 mg tablet TAKE 1 TABLET BY MOUTH 2 TIMES A  tablet 2    tirzepatide (Mounjaro) 2.5 mg/0.5 mL pen injector Inject 2.5 mg under the skin every 7 days. Do not fill before September 10, 2024. 2 mL 11    [DISCONTINUED] propranolol (Inderal) 40 mg tablet Take 1 tablet (40 mg) by mouth 2 times a day. 180 tablet 2     No current facility-administered medications on file prior to visit.      MEDICATION RECONCILIATION  Added: none  Changed: none  Removed: none    Drug Interactions:  None warranting change in therapy at this time    CURRENT DIABETES PHARMACOTHERAPY  Mounjaro 2.5 mg injected subcutaneously once weekly - initiated 10/1/24    HISTORICAL PHARMACOTHERAPY (if " relevant)  Glimepiride - unable to tolerate, pt reports severe rash  Metformin 500 mg XR - take one tablet (500 mg) PO daily with food     SECONDARY PREVENTION  Statin? no  LDL: unable to assess d/t high TGs - no TG therapy started yet  ACE-I/ARB? no  BP: not at goal, < 130/80  UACR:  unable to assess - labs ordered  Aspirin?  no    VBID Employee Diabetes Program Enrollment: Enrollment up to date  Patient enrolled in  Employee diabetes program for $0 co-pays on diabetes medications/supplies.  Requested VBID enrollment date: 8/28/24  PharmD Management Level: Level 3-4   Pharmacy fill location: [facility] - Home delivery    Assessment/Plan   Problem List Items Addressed This Visit    None  Visit Diagnoses       Type 2 diabetes mellitus with hyperglycemia, without long-term current use of insulin              Diabetes:  Patient's Type 2 Diabetes needs improvement with most recent A1c of 6.9% on 7/18/24 (Goal < 7%). Overall, doing very well on current regimen, denies side effects. Reports SMBGs are stable, on average, 110-130 mg/dL and overall in goal range, denies any concerning hypo or hyperglycemic events. Pt expresses approval of ability to lose some of the weight she reportedly gained through menopause. Reports approximately 11 lb weight loss from baseline and weight has stabilized and staying consistently at current weight (~129 lbs). Plan to continue at current dose and do not recommend further titration due to weight. Recommend continued monitoring to ensure glycemic control and weight remains stable.  Continue:  Mounjaro 2.5 injected subcutaneously once weekly    Follow up: pharmacy in 6 months, endo 01/2025      Sheba Nguyen, PharmD     Continue all meds under the continuation of care with the referring provider and clinical pharmacy team. Patient/Caregiver was informed they may decline to participate or withdraw from participation in pharmacy services at any time.

## 2024-12-20 PROCEDURE — RXMED WILLOW AMBULATORY MEDICATION CHARGE

## 2024-12-21 ENCOUNTER — PHARMACY VISIT (OUTPATIENT)
Dept: PHARMACY | Facility: CLINIC | Age: 64
End: 2024-12-21
Payer: COMMERCIAL

## 2025-01-13 ENCOUNTER — PHARMACY VISIT (OUTPATIENT)
Dept: PHARMACY | Facility: CLINIC | Age: 65
End: 2025-01-13
Payer: COMMERCIAL

## 2025-01-13 DIAGNOSIS — G25.0 ESSENTIAL TREMOR: ICD-10-CM

## 2025-01-13 PROCEDURE — RXMED WILLOW AMBULATORY MEDICATION CHARGE

## 2025-01-13 RX ORDER — PRIMIDONE 50 MG/1
150 TABLET ORAL NIGHTLY
Qty: 270 TABLET | Refills: 2 | Status: SHIPPED | OUTPATIENT
Start: 2025-01-13 | End: 2025-10-10

## 2025-01-15 ENCOUNTER — LAB (OUTPATIENT)
Dept: LAB | Facility: LAB | Age: 65
End: 2025-01-15
Payer: COMMERCIAL

## 2025-01-15 DIAGNOSIS — E11.65 TYPE 2 DIABETES MELLITUS WITH HYPERGLYCEMIA, WITHOUT LONG-TERM CURRENT USE OF INSULIN: ICD-10-CM

## 2025-01-15 DIAGNOSIS — E78.1 PURE HYPERTRIGLYCERIDEMIA: ICD-10-CM

## 2025-01-15 LAB
ALBUMIN SERPL BCP-MCNC: 4.2 G/DL (ref 3.4–5)
ALP SERPL-CCNC: 93 U/L (ref 33–136)
ALT SERPL W P-5'-P-CCNC: 9 U/L (ref 7–45)
ANION GAP SERPL CALC-SCNC: 14 MMOL/L (ref 10–20)
AST SERPL W P-5'-P-CCNC: 12 U/L (ref 9–39)
BILIRUB SERPL-MCNC: 0.3 MG/DL (ref 0–1.2)
BUN SERPL-MCNC: 9 MG/DL (ref 6–23)
CALCIUM SERPL-MCNC: 9.5 MG/DL (ref 8.6–10.6)
CHLORIDE SERPL-SCNC: 102 MMOL/L (ref 98–107)
CHOLEST SERPL-MCNC: 169 MG/DL (ref 0–199)
CHOLESTEROL/HDL RATIO: 3.9
CO2 SERPL-SCNC: 27 MMOL/L (ref 21–32)
CREAT SERPL-MCNC: 0.79 MG/DL (ref 0.5–1.05)
CREAT UR-MCNC: 181.1 MG/DL (ref 20–320)
EGFRCR SERPLBLD CKD-EPI 2021: 84 ML/MIN/1.73M*2
EST. AVERAGE GLUCOSE BLD GHB EST-MCNC: 100 MG/DL
GLUCOSE SERPL-MCNC: 106 MG/DL (ref 74–99)
HBA1C MFR BLD: 5.1 %
HDLC SERPL-MCNC: 43.8 MG/DL
LDLC SERPL CALC-MCNC: 74 MG/DL
LDLC SERPL DIRECT ASSAY-MCNC: 92 MG/DL (ref 0–129)
MICROALBUMIN UR-MCNC: 35.7 MG/L
MICROALBUMIN/CREAT UR: 19.7 UG/MG CREAT
NON HDL CHOLESTEROL: 125 MG/DL (ref 0–149)
POTASSIUM SERPL-SCNC: 4.4 MMOL/L (ref 3.5–5.3)
PROT SERPL-MCNC: 7.1 G/DL (ref 6.4–8.2)
SODIUM SERPL-SCNC: 139 MMOL/L (ref 136–145)
TRIGL SERPL-MCNC: 256 MG/DL (ref 0–149)
VLDL: 51 MG/DL (ref 0–40)

## 2025-01-15 PROCEDURE — 80053 COMPREHEN METABOLIC PANEL: CPT

## 2025-01-15 PROCEDURE — 82570 ASSAY OF URINE CREATININE: CPT

## 2025-01-15 PROCEDURE — 83036 HEMOGLOBIN GLYCOSYLATED A1C: CPT

## 2025-01-15 PROCEDURE — 83721 ASSAY OF BLOOD LIPOPROTEIN: CPT

## 2025-01-15 PROCEDURE — 80061 LIPID PANEL: CPT

## 2025-01-15 PROCEDURE — 82043 UR ALBUMIN QUANTITATIVE: CPT

## 2025-01-20 ENCOUNTER — APPOINTMENT (OUTPATIENT)
Dept: ENDOCRINOLOGY | Facility: CLINIC | Age: 65
End: 2025-01-20
Payer: COMMERCIAL

## 2025-01-20 VITALS
WEIGHT: 134.2 LBS | BODY MASS INDEX: 24.69 KG/M2 | TEMPERATURE: 97.3 F | HEIGHT: 62 IN | HEART RATE: 69 BPM | SYSTOLIC BLOOD PRESSURE: 147 MMHG | DIASTOLIC BLOOD PRESSURE: 94 MMHG

## 2025-01-20 DIAGNOSIS — E78.1 PURE HYPERTRIGLYCERIDEMIA: ICD-10-CM

## 2025-01-20 DIAGNOSIS — K75.81 NASH (NONALCOHOLIC STEATOHEPATITIS): ICD-10-CM

## 2025-01-20 DIAGNOSIS — E11.65 TYPE 2 DIABETES MELLITUS WITH HYPERGLYCEMIA, WITHOUT LONG-TERM CURRENT USE OF INSULIN: Primary | ICD-10-CM

## 2025-01-20 DIAGNOSIS — I10 BENIGN ESSENTIAL HTN: ICD-10-CM

## 2025-01-20 PROCEDURE — 3044F HG A1C LEVEL LT 7.0%: CPT | Performed by: NURSE PRACTITIONER

## 2025-01-20 PROCEDURE — 3060F POS MICROALBUMINURIA REV: CPT | Performed by: NURSE PRACTITIONER

## 2025-01-20 PROCEDURE — 3080F DIAST BP >= 90 MM HG: CPT | Performed by: NURSE PRACTITIONER

## 2025-01-20 PROCEDURE — 99214 OFFICE O/P EST MOD 30 MIN: CPT | Performed by: NURSE PRACTITIONER

## 2025-01-20 PROCEDURE — 3077F SYST BP >= 140 MM HG: CPT | Performed by: NURSE PRACTITIONER

## 2025-01-20 PROCEDURE — 1036F TOBACCO NON-USER: CPT | Performed by: NURSE PRACTITIONER

## 2025-01-20 PROCEDURE — 3048F LDL-C <100 MG/DL: CPT | Performed by: NURSE PRACTITIONER

## 2025-01-20 PROCEDURE — 3008F BODY MASS INDEX DOCD: CPT | Performed by: NURSE PRACTITIONER

## 2025-01-20 RX ORDER — TIRZEPATIDE 2.5 MG/.5ML
2.5 INJECTION, SOLUTION SUBCUTANEOUS
Qty: 2 ML | Refills: 11 | Status: SHIPPED | OUTPATIENT
Start: 2025-01-20

## 2025-01-20 ASSESSMENT — PATIENT HEALTH QUESTIONNAIRE - PHQ9
SUM OF ALL RESPONSES TO PHQ9 QUESTIONS 1 AND 2: 1
2. FEELING DOWN, DEPRESSED OR HOPELESS: SEVERAL DAYS
1. LITTLE INTEREST OR PLEASURE IN DOING THINGS: NOT AT ALL
10. IF YOU CHECKED OFF ANY PROBLEMS, HOW DIFFICULT HAVE THESE PROBLEMS MADE IT FOR YOU TO DO YOUR WORK, TAKE CARE OF THINGS AT HOME, OR GET ALONG WITH OTHER PEOPLE: NOT DIFFICULT AT ALL

## 2025-01-20 ASSESSMENT — ENCOUNTER SYMPTOMS
SEIZURES: 0
FREQUENCY: 0
DEPRESSION: 1
APPETITE CHANGE: 0
SHORTNESS OF BREATH: 0
NUMBNESS: 0
DIZZINESS: 0
NERVOUS/ANXIOUS: 0
WEAKNESS: 0
POLYDIPSIA: 0
ACTIVITY CHANGE: 0
LOSS OF SENSATION IN FEET: 0
OCCASIONAL FEELINGS OF UNSTEADINESS: 0
SLEEP DISTURBANCE: 0
PALPITATIONS: 0
POLYPHAGIA: 0
CONSTIPATION: 0
FATIGUE: 0
DIARRHEA: 0
NAUSEA: 0

## 2025-01-20 NOTE — PATIENT INSTRUCTIONS
Type 2 diabetes mellitus, is at goal with A1C 5.1% Jan 2025.   RX changes:   Continue Mounjaro 2.5 mg weekly  You do not need to do finger sticks for the next 6 months.  ENAMORADO: use of Mounjaro will improve ENAMORADO  Hypertension: At goal. No changes.   Hyperlipidemia: Total 169 with trig now 256 and LDL 74. She has never been on a statin and we discussed that it is used as primary prevention for all people with type 2 DM.   Depression score elevated. You denied suicidal thoughts at today's visit. Please reach out to us if you are struggling. You lost your dog in December 2024 and it can be very difficult. Consider volunteering at the dog shelter.   Education:  blood sugar goals  Follow up: I recommend diabetes care be Summer 2025 and if A1C remains unchanged we will send you back to primary care for continued support.

## 2025-01-20 NOTE — PROGRESS NOTES
Subjective   Kavita Jung is a 64 y.o. female here today for a follow up visit regarding Type 2 diabetes. Initial diagnosis with diabetes was July 2024.  She states she had gestational DM at the ages [age] that required insulin.   Father and two paternal aunts all had type 2 diabetes.     She works at  purchasing capital equipment    She [facility] and is now taking injectable B 12 and Prescription strength vitamin D.     Known complications include: none    Previously seeing Primary for diabetes care.    A1c 6.6% July 2024, Previous A1c 5.7% July 2023    Historical meds:  Glimepiride 1 mg broke out in rash  Metformin caused sever diarrhea    Current diabetes regimen is as follows:   Mounjaro 2.5 mg weekly    Patient is not using continuous glucose monitor-   The patient is currently checking the blood glucose once to twice a day. Fasting  mg/dl     Hypoglycemia frequency: N/A  Hypoglycemia awareness: N/A     Regarding symptoms of hyperglycemia, the patient is not experiencing symptoms such as polydipsia, nocturia, blurry vision, and unintentional weight loss.     Last foot exam:   Last eye exam: Feb 2024 before diagnosis. She is aware she is due for exam.   Last urine albumin: random: 181.1   Last LDL: Total 169 with trig 256 and LDL 74 Jan 2025    Review of Systems   Constitutional:  Negative for activity change, appetite change and fatigue.   Respiratory:  Negative for shortness of breath.    Cardiovascular:  Negative for chest pain, palpitations and leg swelling.   Gastrointestinal:  Negative for constipation, diarrhea and nausea.   Endocrine: Negative for cold intolerance, heat intolerance, polydipsia, polyphagia and polyuria.   Genitourinary:  Negative for frequency.   Musculoskeletal:  Negative for gait problem.   Skin:  Negative for rash.   Neurological:  Negative for dizziness, seizures, weakness and numbness.   Psychiatric/Behavioral:  Negative for sleep disturbance and suicidal  "ideas. The patient is not nervous/anxious.       Objective    Blood pressure (!) 147/94, pulse 69, temperature 36.3 °C (97.3 °F), temperature source Temporal, height 1.575 m (5' 2\"), weight 60.9 kg (134 lb 3.2 oz). Repeat 144/80  Physical Exam  Vitals and nursing note reviewed.   HENT:      Head: Atraumatic.   Pulmonary:      Effort: Pulmonary effort is normal.   Skin:     General: Skin is warm.   Neurological:      General: No focal deficit present.      Mental Status: She is alert and oriented to person, place, and time. Mental status is at baseline.      Gait: Gait normal.   Psychiatric:         Mood and Affect: Mood normal.         Behavior: Behavior normal.         Thought Content: Thought content normal.         Judgment: Judgment normal.           Lab Results   Component Value Date    BILITOT 0.3 01/15/2025    CALCIUM 9.5 01/15/2025    CO2 27 01/15/2025     01/15/2025    CREATININE 0.79 01/15/2025    GLUCOSE 106 (H) 01/15/2025    ALKPHOS 93 01/15/2025    K 4.4 01/15/2025    PROT 7.1 01/15/2025     01/15/2025    AST 12 01/15/2025    ALT 9 01/15/2025    BUN 9 01/15/2025    ANIONGAP 14 01/15/2025    PHOS 3.2 11/04/2017    ALBUMIN 4.2 01/15/2025    LIPASE 44 01/23/2024    GFRF 83 10/20/2022     Lab Results   Component Value Date    TRIG 256 (H) 01/15/2025    CHOL 169 01/15/2025    LDLCALC 74 01/15/2025    HDL 43.8 01/15/2025     Lab Results   Component Value Date    HGBA1C 5.1 01/15/2025    HGBA1C 6.9 (H) 07/18/2024    HGBA1C 5.7 (A) 10/20/2022     The 10-year ASCVD risk score (Ashkan ZENG, et al., 2019) is: 13.1%    Values used to calculate the score:      Age: 64 years      Sex: Female      Is Non- : No      Diabetic: Yes      Tobacco smoker: No      Systolic Blood Pressure: 137 mmHg      Is BP treated: Yes      HDL Cholesterol: 43.8 mg/dL      Total Cholesterol: 169 mg/dL    Assessment/Plan   Problem List Items Addressed This Visit       Benign essential HTN     Other Visit " Diagnoses       Type 2 diabetes mellitus with hyperglycemia, without long-term current use of insulin    -  Primary    Pure hypertriglyceridemia        ENAMORADO (nonalcoholic steatohepatitis)              Type 2 diabetes mellitus, is at goal with A1C 5.1% Jan 2025.   RX changes:   Continue Mounjaro 2.5 mg weekly  You do not need to do finger sticks for the next 6 months.  ENAMORADO: use of Mounjaro will improve ENAMORADO  Hypertension: At goal. No changes.   Hyperlipidemia: Total 169 with trig now 256 and LDL 74. She has never been on a statin and we discussed that it is used as primary prevention for all people with type 2 DM.   Depression score elevated. You denied suicidal thoughts at today's visit. Please reach out to us if you are struggling. You lost your dog in December 2024 and it can be very difficult. Consider volunteering at the dog shelter.   Education:  blood sugar goals  Follow up: I recommend diabetes care be Summer 2025 and if A1C remains unchanged we will send you back to primary care for continued support.

## 2025-01-22 ENCOUNTER — APPOINTMENT (OUTPATIENT)
Dept: ENDOCRINOLOGY | Facility: CLINIC | Age: 65
End: 2025-01-22
Payer: COMMERCIAL

## 2025-02-04 ENCOUNTER — APPOINTMENT (OUTPATIENT)
Dept: INTEGRATIVE MEDICINE | Facility: CLINIC | Age: 65
End: 2025-02-04
Payer: COMMERCIAL

## 2025-02-12 PROCEDURE — RXMED WILLOW AMBULATORY MEDICATION CHARGE

## 2025-02-14 ENCOUNTER — PHARMACY VISIT (OUTPATIENT)
Dept: PHARMACY | Facility: CLINIC | Age: 65
End: 2025-02-14
Payer: COMMERCIAL

## 2025-02-26 ENCOUNTER — HOSPITAL ENCOUNTER (OUTPATIENT)
Dept: RADIOLOGY | Facility: HOSPITAL | Age: 65
Discharge: HOME | End: 2025-02-26
Payer: COMMERCIAL

## 2025-02-26 ENCOUNTER — APPOINTMENT (OUTPATIENT)
Dept: RADIOLOGY | Facility: HOSPITAL | Age: 65
End: 2025-02-26
Payer: COMMERCIAL

## 2025-02-26 ENCOUNTER — OFFICE VISIT (OUTPATIENT)
Dept: ORTHOPEDIC SURGERY | Facility: HOSPITAL | Age: 65
End: 2025-02-26
Payer: COMMERCIAL

## 2025-02-26 DIAGNOSIS — M25.561 ACUTE BILATERAL KNEE PAIN: ICD-10-CM

## 2025-02-26 DIAGNOSIS — M17.0 ARTHRITIS OF BOTH KNEES: Primary | ICD-10-CM

## 2025-02-26 DIAGNOSIS — M25.562 ACUTE BILATERAL KNEE PAIN: ICD-10-CM

## 2025-02-26 PROCEDURE — 99214 OFFICE O/P EST MOD 30 MIN: CPT | Performed by: STUDENT IN AN ORGANIZED HEALTH CARE EDUCATION/TRAINING PROGRAM

## 2025-02-26 PROCEDURE — 73564 X-RAY EXAM KNEE 4 OR MORE: CPT | Mod: 50

## 2025-02-26 NOTE — PROGRESS NOTES
Fatmata Fitzgerald MD   Adult Reconstruction and Joint Replacement Surgery  Phone: [phone]     Fax: [phone]       Name: Kavita Jung  Age: 64 y.o.   : 1960   Date of Visit: 2025    INITIAL CONSULTATION    CC: Left knee pain    Clinical History:  This patient presents with several weeks of left worse than right knee pain.     She reports that her left knee produced a popping sensation a couple weeks ago.  This resulted in Pain and swelling.  She reports this pain is nearly all gone.  She has residual pain in her left knee still.  Her right knee pain has completely resolved.    Patient has tried the following Activity modification and Xray. Date of last steroid injection: Never. Patient does have pain at night. Patient does not report falls related to this problem. Patient is able to walk 6 blocks. Patient is currently using nothing as assistive device. Primarily complains of anterior pain. Patient has difficulty with climbing stairs, descending stairs, and getting up from a chair. The pain is significantly impacting their ability to perform activities of daily living. Patient reports no longer able to do activities such as -reports no limitations.     Focused History  PMH: Reviewed, PE/DVT: no, and Diabetes Mellitus  PSH: Reviewed , Hip/Knee replacement: no, Hip/Knee surgery: no, Anesthesia complications: no, Spine surgery: no, Surgical infection: no, and Weight loss surgery: no  SHx: Reviewed, Occupation: , Current smoker: no, EtOH intake weekly: no, Social support: unk, and Preferred physical activities: unk  Jehovah´s Witness: no  Meds: Reviewed, Current Anticoagulants: no, Weight loss medication: no, and Current Opioids: no  Allergies: Reviewed  and The patient reports no contraindications or allergies to cephalosporins, aspirin, NSAIDs or opioids, except as noted above.  Dental Hx: Last routine cleaning:  2025 and All invasive dental work must be  completed 3+ months prior to joint replacement surgery. Dental cleaning must be completed 6+ weeks prior to joint replacement surgery. Patient are to avoid any invasive dental work 3-6 months post-surgically.   FH: No family history of any bleeding or clotting disorders.    PROMs/HISTORY  PROMs   No questionnaires on file.     Past Medical History:   Diagnosis Date    Acute vaginitis 10/20/2022    Vaginitis    Calculus of kidney 10/17/2017    Kidney stone on left side    Horseshoe tear of retina without detachment, right eye     Retinal tear of right eye    Other abnormalities of breathing     Difficulty breathing    Other conditions influencing health status     Gestational Diabetes Mellitus    Other conditions influencing health status     Colonoscopy (Fiberoptic)    Other conditions influencing health status     Mammogram    Other conditions influencing health status     Reported Pap Smear    Other conditions influencing health status 01/04/2017    History of cough    Other vitreous opacities, right eye 10/17/2017    Vitreous floaters of right eye    Personal history of other diseases of the digestive system     History of esophageal reflux    Personal history of other diseases of the musculoskeletal system and connective tissue 07/22/2016    History of temporomandibular joint disorder    Personal history of other specified conditions 02/11/2014    History of diarrhea    Personal history of urinary calculi 12/12/2018    History of renal calculi       Past Medical History:   Diagnosis Date    Acute vaginitis 10/20/2022    Vaginitis    Calculus of kidney 10/17/2017    Kidney stone on left side    Horseshoe tear of retina without detachment, right eye     Retinal tear of right eye    Other abnormalities of breathing     Difficulty breathing    Other conditions influencing health status     Gestational Diabetes Mellitus    Other conditions influencing health status     Colonoscopy (Fiberoptic)    Other conditions  influencing health status     Mammogram    Other conditions influencing health status     Reported Pap Smear    Other conditions influencing health status 2017    History of cough    Other vitreous opacities, right eye 10/17/2017    Vitreous floaters of right eye    Personal history of other diseases of the digestive system     History of esophageal reflux    Personal history of other diseases of the musculoskeletal system and connective tissue 2016    History of temporomandibular joint disorder    Personal history of other specified conditions 2014    History of diarrhea    Personal history of urinary calculi 2018    History of renal calculi     Documented in chart and reviewed.     Past Surgical History:   Procedure Laterality Date     SECTION, CLASSIC  2014     Section    CHOLECYSTECTOMY  2014    Cholecystectomy Laparoscopic    CT ABDOMEN PELVIS ANGIOGRAM W AND/OR WO IV CONTRAST  2017    CT ABDOMEN PELVIS ANGIOGRAM W AND/OR WO IV CONTRAST 2017 [facility]GENCY LEGAC[facility]    DILATION AND CURETTAGE OF UTERUS  2014    Dilation And Curettage    OTHER SURGICAL HISTORY  10/11/2019    Retinal laser photocoagulation    TONSILLECTOMY  2014    Tonsillectomy    TUBAL LIGATION  2014    Tubal Ligation       Allergies: She is allergic to metformin and glimepiride.     Medications:  Current Outpatient Medications   Medication Instructions    alcohol swabs pads, medicated Use one swab to cleanse skin and allow to dry prior to injecting medications or testing blood glucose.    blood sugar diagnostic (OneTouch Verio test strips) strip Check once in the morning before breakfast then 1-2 hours after largest meal of the day    blood-glucose meter (OneTouch Verio Flex meter) misc Use to check glucose as directed.    clotrimazole-betamethasone (Lotrisone) cream APPLY THIN COAT TO AFFECTED AREA TWICE A DAY IN THE MORNING AND IN THE EVENING    cyanocobalamin  "(VITAMIN B-12) 1,000 mcg, intramuscular, Every 30 days    ferrous sulfate (IRON ORAL) 1 tablet, Daily    fluocinonide (Lidex) 0.05 % ointment Apply thin layer twice weekly if needed    insulin syringe-needle U-100 1 mL 30 gauge x 1/2\" syringe Use to inject 1 time IM, once a week as directed for Vit B12 injection    lancets (OneTouch Delica Plus Lancet) 33 gauge misc Use new lancet each time with lancing device to prick finger and test blood glucose up to four times daily as needed.    Mounjaro 2.5 mg, subcutaneous, Every 7 days    multivitamin (MULTIPLE VITAMINS ORAL) 1 tablet, Daily    primidone (MYSOLINE) 150 mg, oral, Nightly    propranolol (INDERAL) 40 mg, oral, 2 times daily       Family History   Problem Relation Name Age of Onset    Tremor Mother          benign essential    Dementia Mother      Alzheimer's disease Mother          late onset, without behavioral disturbance    Colonic polyp Father      Diabetes Father      Heart disease Father      Hypertension Father      Heart attack Brother      Parkinsonism Brother          asymptomatic     Documented in chart and reviewed.     Social History     Tobacco Use    Smoking status: Never     Passive exposure: Past    Smokeless tobacco: Never   Substance Use Topics    Alcohol use: Never        Review of Systems: Review of systems completed with medical assistant intake. Please refer to this note.     Physical Exam:  BMI: 25.    General: The patient is well appearing and has an appropriate affect.     Neurological Examination: SILT in SPN/DPN/Sural/Saphenous/Tibial nerves. 5/5 EHL, FHL, Tibial anterior, Gastrocnemius. Coordination grossly intact.     Cardiovascular Exam: Capillary refill <2 seconds.     Lymphatic Examination: There is no obvious lymphatic swelling present around the involved joint.    Skin Exam: Skin around the pertinent joint is without evidence of infection or rash.    Gait: The patient ambulates with an antalgic gait.     Right Hip " Examination:    There is no tenderness over the greater trochanter.    Range of motion is full extension to 100 degrees of flexion.    The hip is stable without subluxation or dislocation.    The hip internally rotates to 15 degrees and externally rotates to 45 degrees.    There is no pain with hip motion.    Left Hip Examination:    There is no tenderness over the greater trochanter.    Range of motion is full extension to 100 degrees of flexion.    The hip is stable without subluxation or dislocation.    The hip internally rotates to 15 degrees and externally rotates to 45 degrees.    There is no pain with hip motion.    Left Knee Examination:  Examination of the left knee reveals the skin to be intact.    There is a small effusion in the knee.    The alignment of the knee is Varus.    This deformity is correctable.    There is tenderness to palpation over the joint line.    There is significant quadriceps atrophy.    Range of Motion: full extension to 120 degrees of flexion.    The knee is stable to varus-valgus stress and anterior-posterior stress.     There is no grinding with range of motion.    There is mild patellofemoral crepitus.    Right Knee Examination:  Examination of the right knee reveals the skin to be intact.     There is no obvious swelling.    There is a no effusion in the knee.     The alignment of the knee is normal.    There is no tenderness to palpation over the joint line.    There is no significant quadriceps atrophy.    Range of motion is full extension to 120 degrees of flexion.    The knee is stable to varus-valgus stress and anterior-posterior stress.     There is no grinding with range of motion.    There is no patellofemoral crepitus.    Prior Labs:   Prior Labs:   Lab Results   Component Value Date    WBC 7.4 01/23/2024    HGB 14.5 01/23/2024    HCT 44.2 01/23/2024    MCV 94 01/23/2024     01/23/2024      Lab Results   Component Value Date    INR 1.1 04/24/2019    INR 1.0  "11/29/2017    PROTIME 11.8 04/24/2019    PROTIME 11.6 11/29/2017         Lab Results   Component Value Date    GLUCOSE 106 (H) 01/15/2025    CALCIUM 9.5 01/15/2025     01/15/2025    K 4.4 01/15/2025    CO2 27 01/15/2025     01/15/2025    BUN 9 01/15/2025    CREATININE 0.79 01/15/2025      Lab Results   Component Value Date    TROPONINI <0.02 04/24/2019      Lab Results   Component Value Date    HGBA1C 5.1 01/15/2025         No results found for: \"CRP\"   No results found for: \"SEDRATE\"      Radiographs:  Radiographs were personally reviewed today. There is evidence of mild  BILATERAL knee osteoarthritis without PATELLOFEMORAL bone on bone apposition.    Impression:  This patient presents with mild  BILATERAL knee osteoarthritis without bone on bone apposition.     Diagnosis:  Arthritis of both knees     Recommendations / Plan:    I have discussed the options in detail with the patient. We have discussed anti-inflammatory medication, activity modification, physical therapy, corticosteroid injections, viscosupplementation injections, partial knee replacement surgery and total knee replacement surgery.  There is no indication for surgery at that time.    The risks and benefits of all these treatment options have been discussed in detail.     A referral to physical therapy was provided. Patient will continue their home exercise program. Strategies for pain management using over-the-counter anti-inflammatory medications reviewed.  Specifically discussed Ibuprofen up to 800 mg every 8 hours, with close monitoring for potential side effects from this medication.  No indication for steroid injection at this time. Discussed utility of brace. Will defer brace at this time.. Encouraged them to maintain range of motion and strength around the knee joints.  They will continue to implement these strategies in addressing their pain.      Recommend the patient continue optimizing nonsurgical treatment interventions as " outlined above for management of their knee arthritis.  I would be happy to see them again at any point to discuss surgery if indicated or they are more optimized or to review progress with nonsurgical treatment of arthritis. The patient verbalizes understanding with the recommendations and treatment plan as outlined above and is in agreement.  Questions were addressed.      _____________  Fatmata Fitzgerald MD   Attending Orthopaedic Surgeon  [facility]    [facility]     This office note was transcribed with dictation software.  Please excuse any typographical errors, program misunderstandings leading to inadvertent insertions or deletions of inappropriate wording, pronoun errors and other unintentional transcription errors not noticed on proof-reading.

## 2025-03-13 PROCEDURE — RXMED WILLOW AMBULATORY MEDICATION CHARGE

## 2025-03-15 ENCOUNTER — PHARMACY VISIT (OUTPATIENT)
Dept: PHARMACY | Facility: CLINIC | Age: 65
End: 2025-03-15
Payer: COMMERCIAL

## 2025-04-03 ENCOUNTER — APPOINTMENT (OUTPATIENT)
Dept: OPHTHALMOLOGY | Facility: CLINIC | Age: 65
End: 2025-04-03
Payer: COMMERCIAL

## 2025-04-03 DIAGNOSIS — R73.03 PREDIABETES: ICD-10-CM

## 2025-04-03 DIAGNOSIS — H43.813 PVD (POSTERIOR VITREOUS DETACHMENT), BOTH EYES: ICD-10-CM

## 2025-04-03 DIAGNOSIS — H52.13 MYOPIA WITH PRESBYOPIA OF BOTH EYES: Primary | ICD-10-CM

## 2025-04-03 DIAGNOSIS — Z98.890 HISTORY OF REPAIR OF RETINAL TEAR BY LASER PHOTOCOAGULATION: ICD-10-CM

## 2025-04-03 DIAGNOSIS — H52.4 MYOPIA WITH PRESBYOPIA OF BOTH EYES: Primary | ICD-10-CM

## 2025-04-03 DIAGNOSIS — H18.452 SALZMANN'S NODULAR DYSTROPHY OF LEFT EYE: ICD-10-CM

## 2025-04-03 DIAGNOSIS — H25.13 NUCLEAR SCLEROSIS OF BOTH EYES: ICD-10-CM

## 2025-04-03 PROCEDURE — 92015 DETERMINE REFRACTIVE STATE: CPT | Performed by: STUDENT IN AN ORGANIZED HEALTH CARE EDUCATION/TRAINING PROGRAM

## 2025-04-03 PROCEDURE — 92014 COMPRE OPH EXAM EST PT 1/>: CPT | Performed by: STUDENT IN AN ORGANIZED HEALTH CARE EDUCATION/TRAINING PROGRAM

## 2025-04-03 ASSESSMENT — ENCOUNTER SYMPTOMS
PSYCHIATRIC NEGATIVE: 0
HEMATOLOGIC/LYMPHATIC NEGATIVE: 0
RESPIRATORY NEGATIVE: 0
NEUROLOGICAL NEGATIVE: 0
ENDOCRINE NEGATIVE: 0
CONSTITUTIONAL NEGATIVE: 0
CARDIOVASCULAR NEGATIVE: 0
GASTROINTESTINAL NEGATIVE: 0
MUSCULOSKELETAL NEGATIVE: 0
EYES NEGATIVE: 1
ALLERGIC/IMMUNOLOGIC NEGATIVE: 0

## 2025-04-03 ASSESSMENT — REFRACTION_MANIFEST
OS_AXIS: 130
OS_AXIS: 100
OS_SPHERE: -5.50
OD_SPHERE: -6.75
OS_SPHERE: -7.00
OD_ADD: +2.50
OD_CYLINDER: SPHERE
OS_CYLINDER: -2.00
OS_ADD: +2.50
OD_CYLINDER: SPHERE
METHOD_AUTOREFRACTION: 1
OS_CYLINDER: -1.00
OD_SPHERE: -7.25

## 2025-04-03 ASSESSMENT — EXTERNAL EXAM - LEFT EYE: OS_EXAM: NORMAL

## 2025-04-03 ASSESSMENT — CONF VISUAL FIELD
OD_INFERIOR_NASAL_RESTRICTION: 0
OD_INFERIOR_TEMPORAL_RESTRICTION: 0
OS_NORMAL: 1
OS_INFERIOR_NASAL_RESTRICTION: 0
OD_SUPERIOR_NASAL_RESTRICTION: 0
METHOD: COUNTING FINGERS
OS_SUPERIOR_NASAL_RESTRICTION: 0
OD_SUPERIOR_TEMPORAL_RESTRICTION: 0
OS_INFERIOR_TEMPORAL_RESTRICTION: 0
OS_SUPERIOR_TEMPORAL_RESTRICTION: 0
OD_NORMAL: 1

## 2025-04-03 ASSESSMENT — REFRACTION_WEARINGRX
OS_AXIS: 100
OD_SPHERE: -7.50
OS_ADD: +2.50
SPECS_TYPE: FULL TIME SPECS
OS_CYLINDER: -1.00
OD_ADD: +2.50
OS_SPHERE: -7.00
OD_CYLINDER: SPHERE

## 2025-04-03 ASSESSMENT — VISUAL ACUITY
OD_CC: 20/20
OD_CC+: -2
METHOD: SNELLEN - LINEAR
CORRECTION_TYPE: GLASSES
OS_CC: 20/25

## 2025-04-03 ASSESSMENT — TONOMETRY
OS_IOP_MMHG: 11
IOP_METHOD: TONOPEN
OD_IOP_MMHG: 13

## 2025-04-03 ASSESSMENT — CUP TO DISC RATIO
OD_RATIO: .3
OS_RATIO: .3

## 2025-04-03 ASSESSMENT — EXTERNAL EXAM - RIGHT EYE: OD_EXAM: NORMAL

## 2025-04-03 NOTE — PROGRESS NOTES
Assessment/Plan   Diagnoses and all orders for this visit:  Myopia with presbyopia of both eyes  -New spec rx released today per patient request. Ocular health wnl for age OU. Monitor 1 year or sooner prn. Refraction billed today.  -Mild changes to MRX  -previously had printed out a computer only RX   Nuclear sclerosis of both eyes  -mild non visually significant. Pt ed. Monitor  History of repair of retinal tear by laser photocoagulation  PVD (posterior vitreous detachment), both eyes  -stable retinal exam  -Discussed signs and symtpoms of retinal hole, tear, detachment. Patient educated that retinal detachment can lead to permanent vision loss. Patient consents to return if they notice new floaters, flashes, curtain or veil covering vision.  Prediabetes  -no retinopathy observed on exam today od/os, pt ed to continue good BGlc, blood pressure and lipid control, rtc with any changes in vision, otherwise monitor 1 year  Salzmann's nodule left eye  -noted in prior exams back with Dr. Monroe  -discussed with patient today  -continue with OTC AT's     RTC 1 year for annual with DFE and MRX

## 2025-04-10 PROCEDURE — RXMED WILLOW AMBULATORY MEDICATION CHARGE

## 2025-04-12 ENCOUNTER — PHARMACY VISIT (OUTPATIENT)
Dept: PHARMACY | Facility: CLINIC | Age: 65
End: 2025-04-12
Payer: COMMERCIAL

## 2025-04-14 ENCOUNTER — APPOINTMENT (OUTPATIENT)
Dept: NEUROLOGY | Facility: CLINIC | Age: 65
End: 2025-04-14
Payer: COMMERCIAL

## 2025-04-14 VITALS
SYSTOLIC BLOOD PRESSURE: 120 MMHG | DIASTOLIC BLOOD PRESSURE: 77 MMHG | WEIGHT: 128 LBS | RESPIRATION RATE: 16 BRPM | BODY MASS INDEX: 23.41 KG/M2 | HEART RATE: 65 BPM

## 2025-04-14 DIAGNOSIS — Z51.81 THERAPEUTIC DRUG MONITORING: ICD-10-CM

## 2025-04-14 DIAGNOSIS — G25.0 ESSENTIAL TREMOR: Primary | ICD-10-CM

## 2025-04-14 DIAGNOSIS — R41.3 MEMORY LOSS: ICD-10-CM

## 2025-04-14 PROCEDURE — 3074F SYST BP LT 130 MM HG: CPT | Performed by: NURSE PRACTITIONER

## 2025-04-14 PROCEDURE — 1036F TOBACCO NON-USER: CPT | Performed by: NURSE PRACTITIONER

## 2025-04-14 PROCEDURE — 3078F DIAST BP <80 MM HG: CPT | Performed by: NURSE PRACTITIONER

## 2025-04-14 PROCEDURE — 99213 OFFICE O/P EST LOW 20 MIN: CPT | Performed by: NURSE PRACTITIONER

## 2025-04-14 ASSESSMENT — FAHN TOLOSA MARTIN TREMOR (FTM)
LUE TOTAL SCORE: 3
LLE TOTAL SCORE: 0
LE TOE TO FINGER IN A FLEXED POSTURE: 0
TRUNK TOTAL SCORE: 0
FACE TOTAL: 0
DRAWING C TOTAL SCORE: 3
DRAWING C RIGHT SCORE: 1
DRAWING A LEFT SCORE: 2
LE AT REST: 0
TOUNGE WHEN PROTUDED: 0
PART A SUBTOTAL SCORE: 5
TOUNGE AT REST: 0
UE ARMS OUTSTRECHED WRISTS MILDLY EXTENDED FINGERS SPREAD APART: 1
LE TOE TO FINGER IN A FLEXED POSTURE: 0
POURING SCORE: 0
DRAWING B LEFT SCORE: 3
FEEDING OTHER THAN LIQUIDS: 0
TRUNK IN REPOSE: 0
WRITING SCORE: 0
PART C SUBTOTAL SCORE: 0
UE ARM AT REST: 0
DRAWING A TOTAL SCORE: 3
DRAWING A RIGHT SCORE: 1
VOICE IN ACTION: 0
TOUNGE TOTAL SCORE: 0
TREMOR TOTAL SCORE: 17
UE ARM AT REST: 0
DRESSING SCORE: 0
LE AT REST: 0
HYGIENE SCORE: 0
DRAWING B TOTAL SCORE: 5
UE FINGER TO NOSE AND OTHER ACTIONS: 2
BRINGING LIQUIDS TO MOUTH: 0
SPEAKING SCORE: 0
HEAD AT POSTURE WHEN STANDING OR SITTING: 0
RLE TOTAL SCORE: 0
DRAWING C LEFT SCORE: 2
PART B SUBTOTAL SCORE: 12
WORKING SCORE: 0
FACE IN REPOSE: 0
RUE TOTAL SCORE: 2
TRUNK  WHEN SITTING OR STANDING: 0
UE FINGER TO NOSE AND OTHER ACTIONS: 1
HEAD IN REPOSE: 0
UE ARMS OUTSTRECHED WRISTS MILDLY EXTENDED FINGERS SPREAD APART: 1
LE LEG FLEXED AT HIPS AND KNEES AT POSTURE: 0
HEAD TOTAL SCORE: 0
FACE AT POSTURE WHEN STANDING OR SITTING: 0
HANDWRITING WITH DOMINANT HAND: 1
DRAWING B RGHT SCORE: 2
LE LEG FLEXED AT HIPS AND KNEES AT POSTURE: 0
VOICE TOTAL SCORE: 0

## 2025-04-14 ASSESSMENT — PATIENT HEALTH QUESTIONNAIRE - PHQ9
SUM OF ALL RESPONSES TO PHQ9 QUESTIONS 1 AND 2: 0
2. FEELING DOWN, DEPRESSED OR HOPELESS: NOT AT ALL
1. LITTLE INTEREST OR PLEASURE IN DOING THINGS: NOT AT ALL

## 2025-04-14 ASSESSMENT — ENCOUNTER SYMPTOMS
LOSS OF SENSATION IN FEET: 0
OCCASIONAL FEELINGS OF UNSTEADINESS: 1
DEPRESSION: 0

## 2025-04-14 NOTE — PROGRESS NOTES
"Subjective     Kavita Jung is a 64 y.o. year old female who presents with Tremors, here for follow up visit.    HPI  Last visit 10/15/24 with Dr. Mendieta:  Calatrio  Meds unchanged      Tremor not bothersome otherwise unless baking and trying to put mascara on. Does not interfere with typing or writing.   Balance: every once in a while veers right but catches herself.   Falls: denies     Denies vocal tremor but singing feels strained when she is signing in Spiritism  No dysphagia, no change in voice otherwise    Memory: stable, but annoying. Still driving, doing finances and works purchases medical equipment.   B12 injections one a month       Vivid dreams, denies RBD,  says she snores. Wakes up refreshed, weekends she can take a 20min nap but otherwise is OK. Sleeping better since her 11yr old dog passed in Dec, he kept her awake previously.  Denies anxiety/depression issues      Tremor meds:  Propranolol 40mg 1 BID (higher dose caused headache)  Primidone 50mg 3 tabs at bedtime (higher doses \"out of body\" as she woke up)     Prior meds:  Gabapentin caused headache so she stopped    Prior workup:  NP testing with Dr. Bullock 2/11/2019:  These findings are consistent with diagnoses of memory difficulty (.93/R41.3) that are attributed to stress     MRI brain 11/10/2017:  IMPRESSION:  There is no evidence of hemohrage, mass lesion or acute infarction.            Patient Health Questionnaire-2 Score: 0            Current Outpatient Medications:     alcohol swabs pads, medicated, Use one swab to cleanse skin and allow to dry prior to injecting medications or testing blood glucose., Disp: 200 each, Rfl: 11    blood sugar diagnostic (OneTouch Verio test strips) strip, Check once in the morning before breakfast then 1-2 hours after largest meal of the day, Disp: 100 strip, Rfl: 3    blood-glucose meter (OneTouch Verio Flex meter) misc, Use to check glucose as directed., Disp: 1 each, Rfl: 0    " "clotrimazole-betamethasone (Lotrisone) cream, APPLY THIN COAT TO AFFECTED AREA TWICE A DAY IN THE MORNING AND IN THE EVENING, Disp: , Rfl:     cyanocobalamin (Vitamin B-12) 1,000 mcg/mL injection, Inject 1 mL (1,000 mcg) into the muscle every 30 (thirty) days., Disp: 1 mL, Rfl: 11    ferrous sulfate (IRON ORAL), Take 1 tablet by mouth once daily. With food, Disp: , Rfl:     fluocinonide (Lidex) 0.05 % ointment, Apply thin layer twice weekly if needed, Disp: 60 g, Rfl: 1    insulin syringe-needle U-100 1 mL 30 gauge x 1/2\" syringe, Use to inject 1 time IM, once a week as directed for Vit B12 injection, Disp: 100 each, Rfl: 11    lancets (OneTouch Delica Plus Lancet) 33 gauge misc, Use new lancet each time with lancing device to prick finger and test blood glucose up to four times daily as needed., Disp: 100 each, Rfl: 11    multivitamin (MULTIPLE VITAMINS ORAL), Take 1 tablet by mouth once daily., Disp: , Rfl:     primidone (Mysoline) 50 mg tablet, Take 3 tablets (150 mg) by mouth once daily at bedtime., Disp: 270 tablet, Rfl: 2    propranolol (Inderal) 40 mg tablet, TAKE 1 TABLET BY MOUTH 2 TIMES A DAY, Disp: 180 tablet, Rfl: 2    tirzepatide (Mounjaro) 2.5 mg/0.5 mL pen injector, Inject 2.5 mg under the skin every 7 days., Disp: 2 mL, Rfl: 11       Objective   Vitals:    04/14/25 0844   BP: 120/77   BP Location: Right arm   Patient Position: Sitting   BP Cuff Size: Adult   Pulse: 65   Resp: 16   Weight: 58.1 kg (128 lb)                 Physical Exam  No bradykinesia  No rigidity  Normal stand and walk          Assessment/Plan   Ms. Kavita Jung is a 64 y.o. F with ET on maximum tolerated primidone. Tremor stable, not interested in Calatrio at this time. Will monitor labs for primidone, on max tolerated doses of propranolol and primidone, gabapentin caused SE. Due to memory issues would not consider Topamax. She is asking what she can do for her memory which overall is stable, will check TSH and B12, discussed " "exercise, diet and staying socially/physically active. She is getting B12 inj, will see if helpful as last dose this month.      Diagnoses and all orders for this visit:  Essential tremor  -     CBC and Auto Differential; Future  -     Folate; Future  Memory loss  -     Vitamin B12  -     TSH with reflex to Free T4 if abnormal; Future  Therapeutic drug monitoring  -     CBC and Auto Differential; Future  -     Folate; Future      #Monitor feeling of strained voice when singing and if worsens or changes, can refer to ENT, especially if other changes like voice changes, problems swallowing    #Labs for monitoring primidone and memory    #Continue meds unchanged:  Propranolol 40mg 1 BID (higher dose caused headache)  Primidone 50mg 3 tabs at bedtime (higher doses \"out of body\" as she woke up)    #Follow up in 6-8M with Dr. Anam Anderson, NP-C  Adult/Gerontological Nurse Practitioner   [facility], Department of Neurology  [facility]  [facility]  [address] [place] [id number]  Phone: [phone]  Fax: [phone]  "

## 2025-04-14 NOTE — PATIENT INSTRUCTIONS
"  #Monitor feeling of strained voice when singing and if worsens or changes, can refer to ENT, especially if other changes like voice changes, problems swallowing    #Labs for monitoring primidone and memory    #Continue meds unchanged:  Propranolol 40mg 1 BID (higher dose caused headache)  Primidone 50mg 3 tabs at bedtime (higher doses \"out of body\" as she woke up)    #Follow up in 6-8M with Dr. Anam Anderson, NP-C  Adult/Gerontological Nurse Practitioner   [facility], Department of Neurology  [facility] [place]  [facility]  [address] [id number]  Phone: [phone]  Fax: [phone]  "

## 2025-04-15 ENCOUNTER — APPOINTMENT (OUTPATIENT)
Dept: NEUROLOGY | Facility: CLINIC | Age: 65
End: 2025-04-15
Payer: COMMERCIAL

## 2025-04-15 DIAGNOSIS — E53.8 LOW SERUM VITAMIN B12: Primary | ICD-10-CM

## 2025-04-15 LAB
BASOPHILS # BLD AUTO: 51 CELLS/UL (ref 0–200)
BASOPHILS NFR BLD AUTO: 1 %
EOSINOPHIL # BLD AUTO: 122 CELLS/UL (ref 15–500)
EOSINOPHIL NFR BLD AUTO: 2.4 %
ERYTHROCYTE [DISTWIDTH] IN BLOOD BY AUTOMATED COUNT: 13.6 % (ref 11–15)
FOLATE SERPL-MCNC: 13.2 NG/ML
HCT VFR BLD AUTO: 41.3 % (ref 35–45)
HGB BLD-MCNC: 13.1 G/DL (ref 11.7–15.5)
LYMPHOCYTES # BLD AUTO: 1352 CELLS/UL (ref 850–3900)
LYMPHOCYTES NFR BLD AUTO: 26.5 %
MCH RBC QN AUTO: 29.4 PG (ref 27–33)
MCHC RBC AUTO-ENTMCNC: 31.7 G/DL (ref 32–36)
MCV RBC AUTO: 92.8 FL (ref 80–100)
MONOCYTES # BLD AUTO: 403 CELLS/UL (ref 200–950)
MONOCYTES NFR BLD AUTO: 7.9 %
NEUTROPHILS # BLD AUTO: 3172 CELLS/UL (ref 1500–7800)
NEUTROPHILS NFR BLD AUTO: 62.2 %
PLATELET # BLD AUTO: 212 THOUSAND/UL (ref 140–400)
PMV BLD REES-ECKER: 9.6 FL (ref 7.5–12.5)
RBC # BLD AUTO: 4.45 MILLION/UL (ref 3.8–5.1)
TSH SERPL-ACNC: 1.47 MIU/L (ref 0.4–4.5)
VIT B12 SERPL-MCNC: 298 PG/ML (ref 200–1100)
WBC # BLD AUTO: 5.1 THOUSAND/UL (ref 3.8–10.8)

## 2025-05-14 PROCEDURE — RXMED WILLOW AMBULATORY MEDICATION CHARGE

## 2025-05-15 ENCOUNTER — PHARMACY VISIT (OUTPATIENT)
Dept: PHARMACY | Facility: CLINIC | Age: 65
End: 2025-05-15
Payer: COMMERCIAL

## 2025-06-07 PROCEDURE — RXMED WILLOW AMBULATORY MEDICATION CHARGE

## 2025-06-10 ENCOUNTER — PHARMACY VISIT (OUTPATIENT)
Dept: PHARMACY | Facility: CLINIC | Age: 65
End: 2025-06-10
Payer: COMMERCIAL

## 2025-06-19 ENCOUNTER — APPOINTMENT (OUTPATIENT)
Dept: PHARMACY | Facility: HOSPITAL | Age: 65
End: 2025-06-19
Payer: COMMERCIAL

## 2025-06-19 DIAGNOSIS — E11.65 TYPE 2 DIABETES MELLITUS WITH HYPERGLYCEMIA, WITHOUT LONG-TERM CURRENT USE OF INSULIN: Primary | ICD-10-CM

## 2025-06-19 NOTE — PATIENT INSTRUCTIONS
Thank you for entrusting your care to the Clinical Pharmacy Team! We look forward to seeing you again soon.  Please call your clinical pharmacist with any questions or concerns.    You are enrolled in the [facility] Employee Value Based Insurance Design (VBID) program. This program allows you to receive for $0 co-pays on diabetes medications/supplies.  To maintain your eligibility for this program, you must:  Maintain  employee insurance coverage  Fill prescriptions at a  pharmacy for pick-up or home delivery  Complete a visit with a clinical pharmacist at least once annually    Sheba Nguyen, PharmD  P: [phone]    To schedule an appointment, please contact:  P: [phone]

## 2025-06-19 NOTE — Clinical Note
" Employee $0 Diabetes Meds/Supplies Program (VBID) visit completed. Continues to do well on current regimen.  Concerns: reports new onset of general ache/discomfort, reports \"insides hurt\". No clear cause, only intermittent. Possibly worsened w/ exertion. Pt to continue monitoring and contact PCP if worsens.   Ordered CMP to be obtained prior to next visit w/ you. "

## 2025-06-19 NOTE — PROGRESS NOTES
[facility] ([facility])    Kavita Jung is a 64 y.o. female referred to Clinical Pharmacy Team as part of the [employer] ([employer]) diabetes program. Pharmacy team may also provide assistance in diabetes management per discussion with referring provider and/or endocrinology. Referring Provider: Devora Hernandez, APR*    Does patient follow with Endocrinology: Yes  Endocrinology Provider Name: ZENON Hernandez    Subjective   Allergies   Allergen Reactions    Metformin Diarrhea    Glimepiride Hives and Rash     Pt reports severe topical rash/hives with glimepiride after 4 days of use, unable to tolerate   Reviewed by Sheba Nguyen, PharmD 06/19/25    Diabetes  She presents for her follow-up diabetic visit. She has type 2 diabetes mellitus. The initial diagnosis of diabetes was made 3 months (new diagnosis, hx of gestational diabetes) ago. There are no hypoglycemic associated symptoms. There are no hypoglycemic complications. Risk factors for coronary artery disease include diabetes mellitus, hypertension, post-menopausal and dyslipidemia. An ACE inhibitor/angiotensin II receptor blocker is not being taken. Eye exam is current.     Pertinent PMH Review:  PMH of Pancreatitis: No  PMH of Retinopathy: No - last eye exam, Jan 2024  PMH of Urinary Tract Infections: No  PMH of MTC: No    HOME MONITORING  Blood Glucose  Monitoring Device: OneTouch Verio Flex meter, manual glucometer  Monitoring Frequency: not currently testing BG  BG log not present at today's visit, denies hypo/hyperglycemic s/sx    Previous BGs   AM -119, 109, 131  PM - 104  PM - 219 mg/dL, highest seen, about 2 hours after meal, ate at restaurant (higher carb meal)    Any episodes of hypoglycemia? No, denies.      Weight:  Current weight (6/19/25): 127 lbs - reports steady at home  12/17/24: 129 lbs  Reports starting weight: 140 lbs    ADHERENCE  Affordability/Accessibility: no concerns, enrolled in [facility] $0  "copay program, has refills  Adherence: no concerns, no doses missed  Side effects:   A few episodes of diarrhea, but mild and resolves. Pt expects due to combination of medication, food, preexisting stomach problems. Reports mild and infrequent, not interfering w/ daily activities.    Objective     LAB  Lab Results   Component Value Date    BILITOT 0.3 01/15/2025    CALCIUM 9.5 01/15/2025    CO2 27 01/15/2025     01/15/2025    CREATININE 0.79 01/15/2025    GLUCOSE 106 (H) 01/15/2025    ALKPHOS 93 01/15/2025    K 4.4 01/15/2025    PROT 7.1 01/15/2025     01/15/2025    AST 12 01/15/2025    ALT 9 01/15/2025    BUN 9 01/15/2025    ANIONGAP 14 01/15/2025    PHOS 3.2 11/04/2017    ALBUMIN 4.2 01/15/2025    LIPASE 44 01/23/2024    GFRF 83 10/20/2022     Lab Results   Component Value Date    TRIG 256 (H) 01/15/2025    CHOL 169 01/15/2025    LDLCALC 74 01/15/2025    HDL 43.8 01/15/2025     Lab Results   Component Value Date    HGBA1C 5.1 01/15/2025     Estimated body mass index is 23.41 kg/m² as calculated from the following:    Height as of 1/20/25: 1.575 m (5' 2\").    Weight as of 4/14/25: 58.1 kg (128 lb).     Current Outpatient Medications on File Prior to Visit   Medication Sig Dispense Refill    cyanocobalamin (Vitamin B-12) 1,000 mcg/mL injection Inject 1 mL (1,000 mcg) into the muscle every 30 (thirty) days. 1 mL 11    ferrous sulfate (IRON ORAL) Take 1 tablet by mouth once daily. With food      multivitamin (MULTIPLE VITAMINS ORAL) Take 1 tablet by mouth once daily.      primidone (Mysoline) 50 mg tablet Take 3 tablets (150 mg) by mouth once daily at bedtime. 270 tablet 2    propranolol (Inderal) 40 mg tablet TAKE 1 TABLET BY MOUTH 2 TIMES A  tablet 2    tirzepatide (Mounjaro) 2.5 mg/0.5 mL pen injector Inject 2.5 mg under the skin every 7 days. 2 mL 11    alcohol swabs Use one swab to cleanse skin and allow to dry prior to injecting medications or testing blood glucose. 200 each 11    blood sugar " "diagnostic (OneTouch Verio test strips) strip Check once in the morning before breakfast then 1-2 hours after largest meal of the day 100 strip 3    blood-glucose meter (OneTouch Verio Flex meter) misc Use to check glucose as directed. 1 each 0    clotrimazole-betamethasone (Lotrisone) cream APPLY THIN COAT TO AFFECTED AREA TWICE A DAY IN THE MORNING AND IN THE EVENING      fluocinonide (Lidex) 0.05 % ointment Apply thin layer twice weekly if needed 60 g 1    insulin syringe-needle U-100 1 mL 30 gauge x 1/2\" syringe Use to inject 1 time IM, once a week as directed for Vit B12 injection 100 each 11    lancets (OneTouch Delica Plus Lancet) 33 gauge misc Use new lancet each time with lancing device to prick finger and test blood glucose up to four times daily as needed. 100 each 11     No current facility-administered medications on file prior to visit.      MEDICATION RECONCILIATION  Added: none  Changed: none  Removed: none    Drug Interactions:  None warranting change in therapy at this time    CURRENT DIABETES PHARMACOTHERAPY  Mounjaro 2.5 mg injected subcutaneously once weekly     HISTORICAL PHARMACOTHERAPY (if relevant)  Glimepiride - unable to tolerate, pt reports severe rash  Metformin - discontinued d/t side effects, GI upset/diarrhea. Highest tolerated dose = 0 mg/day    SECONDARY PREVENTION  Statin? no  LDL: 74 mg/dL, not at goal but borderline.   ACE-I/ARB? no  BP: at goal, < 130/80  UACR: normal  Aspirin?  no    [employer] Employee Diabetes Program Enrollment: Renewal  Patient enrolled in  Employee diabetes program for $0 co-pays on diabetes medications/supplies.  Requested [employer] enrollment date: 6/19/25  PharmD Management Level: Level 3-4   Pharmacy fill location: [facility] - Home delivery    _______________________________________________________________________    DISCUSSION/NOTES    Overall doing well on current DM regimen, denies concerning side effects. A1c at goal, weight sable. Plan " "to continue current regimen.  Complications/comorbidity regimen  BP improved - at goal  LDL - borderline, no statin therapy  During today's visit, pt mentioned new onset of abnormal symptoms: reports \"insides hurt\". Describes as dull ache that is intermittent, generalized, not particular area. Does report occasionally when breathing in, feels ache in ribs. Occasionally experiencing back pain - sharp, short lived twinge. Exacerbating factors: exercise/exertion - reports feels more when taking laundry up and down stairs, general cleaning.   Other symptoms  Denies hx lung disease (asthma, COPD, allergies).   Denies trauma, change in medications or routine.  Denies pain while urinating, changes to urine, gross hematuria.   Reports increased urinary frequency - attributes to not keeping up with PT exercises for OAB.   No easily identifiable cause, no pharmacotherapy concerns. Pt to continue monitoring and if worsens, to follow up with PCP for evaluation.   Plan to order lab work to screen for general abnormalities; pt to obtain prior to next visit w/ endo in ~ 1 month.       Assessment/Plan   Problem List Items Addressed This Visit    None  Visit Diagnoses         Type 2 diabetes mellitus with hyperglycemia, without long-term current use of insulin              Diabetes:  Patient's Type 2 Diabetes improved and at goal w/ A1c of 5.1% on 1/ (Goal < 7%). Overall, doing very well on current regimen, denies side effects, s/sx hypo/hyperglycemia. No longer testing BG daily as directed by endo, has meter PRN. Home weight has been stable over last 6 months, at goal. Plan to continue current tx regimen.  Continue:  Mounjaro 2.5 injected subcutaneously once weekly    Follow up: 10-12 months for renewal      Sheba Nguyen, AndreasD     Continue all meds under the continuation of care with the referring provider and clinical pharmacy team. Patient/Caregiver was informed they may decline to participate or withdraw from participation in " pharmacy services at any time.

## 2025-07-07 PROCEDURE — RXMED WILLOW AMBULATORY MEDICATION CHARGE

## 2025-07-10 ENCOUNTER — PHARMACY VISIT (OUTPATIENT)
Dept: PHARMACY | Facility: CLINIC | Age: 65
End: 2025-07-10
Payer: COMMERCIAL

## 2025-07-18 ENCOUNTER — TELEPHONE (OUTPATIENT)
Dept: ENDOCRINOLOGY | Facility: CLINIC | Age: 65
End: 2025-07-18
Payer: COMMERCIAL

## 2025-07-18 NOTE — TELEPHONE ENCOUNTER
Spoke with patient, appt reminder provided/and lab reminder/ please have labs done at any  lab prior to the appt.  Please arrive 15 minutes early for the appt.    Directions to the building provided/ informed patient the appt is in the [facility] at [address] [place] [address].

## 2025-07-19 LAB
ALBUMIN SERPL-MCNC: 4.2 G/DL (ref 3.6–5.1)
ALP SERPL-CCNC: 75 U/L (ref 37–153)
ALT SERPL-CCNC: 8 U/L (ref 6–29)
ANION GAP SERPL CALCULATED.4IONS-SCNC: 8 MMOL/L (CALC) (ref 7–17)
AST SERPL-CCNC: 13 U/L (ref 10–35)
BILIRUB SERPL-MCNC: 0.4 MG/DL (ref 0.2–1.2)
BUN SERPL-MCNC: 10 MG/DL (ref 7–25)
CALCIUM SERPL-MCNC: 9.2 MG/DL (ref 8.6–10.4)
CHLORIDE SERPL-SCNC: 103 MMOL/L (ref 98–110)
CHOLEST SERPL-MCNC: 175 MG/DL
CHOLEST/HDLC SERPL: 3.6 (CALC)
CO2 SERPL-SCNC: 28 MMOL/L (ref 20–32)
CREAT SERPL-MCNC: 0.76 MG/DL (ref 0.5–1.05)
EGFRCR SERPLBLD CKD-EPI 2021: 88 ML/MIN/1.73M2
GLUCOSE SERPL-MCNC: 92 MG/DL (ref 65–99)
HDLC SERPL-MCNC: 49 MG/DL
LDLC SERPL CALC-MCNC: 93 MG/DL (CALC)
NONHDLC SERPL-MCNC: 126 MG/DL (CALC)
POTASSIUM SERPL-SCNC: 4.5 MMOL/L (ref 3.5–5.3)
PROT SERPL-MCNC: 6.9 G/DL (ref 6.1–8.1)
SODIUM SERPL-SCNC: 139 MMOL/L (ref 135–146)
TRIGL SERPL-MCNC: 236 MG/DL

## 2025-07-21 ENCOUNTER — APPOINTMENT (OUTPATIENT)
Dept: ENDOCRINOLOGY | Facility: CLINIC | Age: 65
End: 2025-07-21
Payer: COMMERCIAL

## 2025-07-21 VITALS
BODY MASS INDEX: 24.46 KG/M2 | DIASTOLIC BLOOD PRESSURE: 85 MMHG | SYSTOLIC BLOOD PRESSURE: 141 MMHG | HEIGHT: 62 IN | HEART RATE: 76 BPM | WEIGHT: 132.9 LBS

## 2025-07-21 DIAGNOSIS — I10 BENIGN ESSENTIAL HTN: ICD-10-CM

## 2025-07-21 DIAGNOSIS — K75.81 NASH (NONALCOHOLIC STEATOHEPATITIS): ICD-10-CM

## 2025-07-21 DIAGNOSIS — E78.5 HYPERLIPIDEMIA LDL GOAL <70: Primary | ICD-10-CM

## 2025-07-21 DIAGNOSIS — R19.7 DIARRHEA, UNSPECIFIED TYPE: ICD-10-CM

## 2025-07-21 DIAGNOSIS — E11.65 TYPE 2 DIABETES MELLITUS WITH HYPERGLYCEMIA, WITHOUT LONG-TERM CURRENT USE OF INSULIN: ICD-10-CM

## 2025-07-21 LAB — POC HEMOGLOBIN A1C: 5.5 % (ref 4.2–6.5)

## 2025-07-21 PROCEDURE — 1036F TOBACCO NON-USER: CPT | Performed by: NURSE PRACTITIONER

## 2025-07-21 PROCEDURE — 83036 HEMOGLOBIN GLYCOSYLATED A1C: CPT | Performed by: NURSE PRACTITIONER

## 2025-07-21 PROCEDURE — 3044F HG A1C LEVEL LT 7.0%: CPT | Performed by: NURSE PRACTITIONER

## 2025-07-21 PROCEDURE — 3077F SYST BP >= 140 MM HG: CPT | Performed by: NURSE PRACTITIONER

## 2025-07-21 PROCEDURE — 3008F BODY MASS INDEX DOCD: CPT | Performed by: NURSE PRACTITIONER

## 2025-07-21 PROCEDURE — 99215 OFFICE O/P EST HI 40 MIN: CPT | Performed by: NURSE PRACTITIONER

## 2025-07-21 PROCEDURE — 3079F DIAST BP 80-89 MM HG: CPT | Performed by: NURSE PRACTITIONER

## 2025-07-21 PROCEDURE — RXMED WILLOW AMBULATORY MEDICATION CHARGE

## 2025-07-21 RX ORDER — TIRZEPATIDE 2.5 MG/.5ML
2.5 INJECTION, SOLUTION SUBCUTANEOUS
Qty: 2 ML | Refills: 11 | Status: SHIPPED | OUTPATIENT
Start: 2025-07-21

## 2025-07-21 RX ORDER — ATORVASTATIN CALCIUM 10 MG/1
10 TABLET, FILM COATED ORAL DAILY
Qty: 90 TABLET | Refills: 3 | Status: SHIPPED | OUTPATIENT
Start: 2025-07-21

## 2025-07-21 ASSESSMENT — ENCOUNTER SYMPTOMS
OCCASIONAL FEELINGS OF UNSTEADINESS: 0
NERVOUS/ANXIOUS: 0
FATIGUE: 0
SHORTNESS OF BREATH: 0
SLEEP DISTURBANCE: 0
CONSTIPATION: 0
NAUSEA: 0
LOSS OF SENSATION IN FEET: 0
DIZZINESS: 0
DEPRESSION: 0
FREQUENCY: 0
ACTIVITY CHANGE: 0
POLYPHAGIA: 0
PALPITATIONS: 0
WEAKNESS: 0
SEIZURES: 0
POLYDIPSIA: 0
NUMBNESS: 0
DIARRHEA: 1
APPETITE CHANGE: 0

## 2025-07-21 ASSESSMENT — PAIN SCALES - GENERAL: PAINLEVEL_OUTOF10: 4

## 2025-07-21 NOTE — PATIENT INSTRUCTIONS
Type 2 diabetes mellitus, is at goal with A1C 5.5% in office today  RX changes:   Continue Mounjaro 2.5 mg weekly  You do not need to do finger sticks for the next 6 months.  ENAMORADO: use of Mounjaro will improve ENAMORADO  Hypertension: At goal. No changes.   Hyperlipidemia: Total 174 with trig 236 and LDL 93. She has a family history of  brother of MI/death at 64. Rx sent for atorvastatin 10 mg daily.   Education:  blood sugar goals  Follow up: I recommend diabetes care be March 2026 or sooner if needed.

## 2025-07-21 NOTE — PROGRESS NOTES
Subjective   Kavita Jung is a 64 y.o. female here today for a follow up visit regarding Type 2 diabetes. Initial diagnosis with diabetes was July 2024.  She states she had gestational DM at the ages [age] that required insulin.   Father and two paternal aunts all had type 2 diabetes.     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     Known complications include: none    Previously seeing Primary for diabetes care.    A1C 5.5% in office today, A1c 6.6% July 2024, Previous A1c 5.7% July 2023    Historical meds:  Glimepiride 1 mg broke out in rash  Metformin caused sever diarrhea    Current diabetes regimen is as follows:   Mounjaro 2.5 mg weekly    Patient is not using continuous glucose monitor-none. The patient is not currently checking the blood glucose    Hypoglycemia frequency: N/A  Hypoglycemia awareness: N/A     Regarding symptoms of hyperglycemia, the patient is not experiencing symptoms such as polydipsia, nocturia, blurry vision, and unintentional weight loss.     Last foot exam: None  Last eye exam: May 2025 no retinopathy.   Last urine albumin: random: 181.1 Jan 2025  Last LDL: Total 169 with trig 256 and LDL 74 Jan 2025    Review of Systems   Constitutional:  Negative for activity change, appetite change and fatigue.   Respiratory:  Negative for shortness of breath.    Cardiovascular:  Negative for chest pain, palpitations and leg swelling.   Gastrointestinal:  Positive for diarrhea. Negative for constipation and nausea.        States she continues to have diarrhea and bloating typically 1 time per week, has increased to 4 times this week. States she has issues with both healthy salads and to-go foods even grilled chicken. Is concerned it is related to gall bladder removal 10 years ago.    Endocrine: Negative for cold intolerance, heat intolerance, polydipsia, polyphagia and polyuria.  "  Genitourinary:  Negative for frequency.   Musculoskeletal:  Negative for gait problem.   Skin:  Negative for rash.   Neurological:  Negative for dizziness, seizures, weakness and numbness.   Psychiatric/Behavioral:  Negative for sleep disturbance and suicidal ideas. The patient is not nervous/anxious.       Objective    Blood pressure 141/85, pulse 76, height 1.575 m (5' 2\"), weight 60.3 kg (132 lb 14.4 oz).   Physical Exam  Vitals and nursing note reviewed.   HENT:      Head: Atraumatic.   Pulmonary:      Effort: Pulmonary effort is normal.     Skin:     General: Skin is warm.     Neurological:      General: No focal deficit present.      Mental Status: She is alert and oriented to person, place, and time. Mental status is at baseline.      Gait: Gait normal.     Psychiatric:         Mood and Affect: Mood normal.         Behavior: Behavior normal.         Thought Content: Thought content normal.         Judgment: Judgment normal.           Lab Results   Component Value Date    BILITOT 0.4 07/18/2025    CALCIUM 9.2 07/18/2025    CO2 28 07/18/2025     07/18/2025    CREATININE 0.76 07/18/2025    GLUCOSE 92 07/18/2025    ALKPHOS 75 07/18/2025    K 4.5 07/18/2025    PROT 6.9 07/18/2025     07/18/2025    AST 13 07/18/2025    ALT 8 07/18/2025    BUN 10 07/18/2025    ANIONGAP 8 07/18/2025    PHOS 3.2 11/04/2017    ALBUMIN 4.2 07/18/2025    LIPASE 44 01/23/2024    GFRF 83 10/20/2022     Lab Results   Component Value Date    TRIG 236 (H) 07/18/2025    CHOL 175 07/18/2025    LDLCALC 93 07/18/2025    HDL 49 (L) 07/18/2025     Lab Results   Component Value Date    HGBA1C 5.1 01/15/2025    HGBA1C 6.9 (H) 07/18/2024    HGBA1C 5.7 (A) 10/20/2022     The 10-year ASCVD risk score (Ashkan ZENG, et al., 2019) is: 9.8%    Values used to calculate the score:      Age: 64 years      Clincally relevant sex: Female      Is Non- : No      Diabetic: Yes      Tobacco smoker: No      Systolic Blood Pressure: " 120 mmHg      Is BP treated: Yes      HDL Cholesterol: 49 mg/dL      Total Cholesterol: 175 mg/dL    Assessment/Plan   Problem List Items Addressed This Visit       Benign essential HTN     Other Visit Diagnoses         Hyperlipidemia LDL goal <70    -  Primary    Relevant Medications    atorvastatin (Lipitor) 10 mg tablet    Other Relevant Orders    Lipid panel      Type 2 diabetes mellitus with hyperglycemia, without long-term current use of insulin        Relevant Medications    atorvastatin (Lipitor) 10 mg tablet    tirzepatide (Mounjaro) 2.5 mg/0.5 mL pen injector    Other Relevant Orders    POCT glycosylated hemoglobin (Hb A1C) manually resulted (Completed)      ENAMORADO (nonalcoholic steatohepatitis)          Diarrhea, unspecified type        Relevant Orders    Referral to Gastroenterology          Type 2 diabetes mellitus, is at goal with A1C 5.5% in office today  RX changes:   Continue Mounjaro 2.5 mg weekly  You do not need to do finger sticks for the next 6 months.  ENAMORADO: use of Mounjaro will improve ENAMORADO  Hypertension: At goal. No changes.   Hyperlipidemia: Total 174 with trig 236 and LDL 93. She has a family history of  brother of MI/death at 64. Rx sent for atorvastatin 10 mg daily.   Diarrhea: referral to gastroenterology.   Education:  blood sugar goals  Follow up: I recommend diabetes care be March 2026 or sooner if needed.

## 2025-07-24 ENCOUNTER — PHARMACY VISIT (OUTPATIENT)
Dept: PHARMACY | Facility: CLINIC | Age: 65
End: 2025-07-24
Payer: COMMERCIAL

## 2025-08-01 PROCEDURE — RXMED WILLOW AMBULATORY MEDICATION CHARGE

## 2025-08-02 ENCOUNTER — PHARMACY VISIT (OUTPATIENT)
Dept: PHARMACY | Facility: CLINIC | Age: 65
End: 2025-08-02
Payer: COMMERCIAL

## 2025-08-14 ENCOUNTER — APPOINTMENT (OUTPATIENT)
Dept: OBSTETRICS AND GYNECOLOGY | Facility: CLINIC | Age: 65
End: 2025-08-14
Payer: COMMERCIAL

## 2025-08-15 DIAGNOSIS — E53.8 LOW SERUM VITAMIN B12: ICD-10-CM

## 2025-09-16 ENCOUNTER — APPOINTMENT (OUTPATIENT)
Dept: NEUROLOGY | Facility: CLINIC | Age: 65
End: 2025-09-16
Payer: COMMERCIAL

## 2025-09-17 ENCOUNTER — APPOINTMENT (OUTPATIENT)
Dept: OBSTETRICS AND GYNECOLOGY | Facility: CLINIC | Age: 65
End: 2025-09-17
Payer: COMMERCIAL

## 2025-10-17 ENCOUNTER — APPOINTMENT (OUTPATIENT)
Dept: GASTROENTEROLOGY | Facility: CLINIC | Age: 65
End: 2025-10-17
Payer: COMMERCIAL

## 2025-10-21 ENCOUNTER — APPOINTMENT (OUTPATIENT)
Dept: DERMATOLOGY | Facility: CLINIC | Age: 65
End: 2025-10-21
Payer: COMMERCIAL

## 2025-10-24 ENCOUNTER — APPOINTMENT (OUTPATIENT)
Dept: DERMATOLOGY | Facility: CLINIC | Age: 65
End: 2025-10-24
Payer: COMMERCIAL

## 2026-03-02 ENCOUNTER — APPOINTMENT (OUTPATIENT)
Dept: ENDOCRINOLOGY | Facility: CLINIC | Age: 66
End: 2026-03-02
Payer: COMMERCIAL

## 2026-03-16 ENCOUNTER — APPOINTMENT (OUTPATIENT)
Dept: ENDOCRINOLOGY | Facility: CLINIC | Age: 66
End: 2026-03-16
Payer: COMMERCIAL

## 2026-04-06 ENCOUNTER — APPOINTMENT (OUTPATIENT)
Dept: OPHTHALMOLOGY | Facility: CLINIC | Age: 66
End: 2026-04-06
Payer: COMMERCIAL